# Patient Record
Sex: FEMALE | Race: WHITE | NOT HISPANIC OR LATINO
[De-identification: names, ages, dates, MRNs, and addresses within clinical notes are randomized per-mention and may not be internally consistent; named-entity substitution may affect disease eponyms.]

---

## 2021-07-16 ENCOUNTER — NON-APPOINTMENT (OUTPATIENT)
Age: 26
End: 2021-07-16

## 2021-08-13 PROBLEM — Z00.00 ENCOUNTER FOR PREVENTIVE HEALTH EXAMINATION: Status: ACTIVE | Noted: 2021-08-13

## 2021-08-20 ENCOUNTER — ASOB RESULT (OUTPATIENT)
Age: 26
End: 2021-08-20

## 2021-08-20 ENCOUNTER — APPOINTMENT (OUTPATIENT)
Dept: ANTEPARTUM | Facility: CLINIC | Age: 26
End: 2021-08-20
Payer: COMMERCIAL

## 2021-08-20 DIAGNOSIS — O35.1XX0 MATERNAL CARE FOR (SUSPECTED) CHROMOSOMAL ABNORMALITY IN FETUS, NOT APPLICABLE OR UNSPECIFIED: ICD-10-CM

## 2021-08-20 PROCEDURE — 76813 OB US NUCHAL MEAS 1 GEST: CPT

## 2021-08-20 PROCEDURE — 36416 COLLJ CAPILLARY BLOOD SPEC: CPT

## 2021-08-20 PROCEDURE — ZZZZZ: CPT

## 2021-08-23 ENCOUNTER — TRANSCRIPTION ENCOUNTER (OUTPATIENT)
Age: 26
End: 2021-08-23

## 2021-08-26 LAB
1ST TRIMESTER DATA: NORMAL
ADDENDUM DOC: NORMAL
AFP PNL SERPL: NORMAL
AFP SERPL-ACNC: NORMAL
CLINICAL BIOCHEMIST REVIEW: NORMAL
FREE BETA HCG 1ST TRIMESTER: NORMAL
Lab: NORMAL
NOTES NTD: NORMAL
NT: NORMAL
PAPP-A SERPL-ACNC: NORMAL
TRISOMY 18/3: NORMAL

## 2021-09-10 ENCOUNTER — ASOB RESULT (OUTPATIENT)
Age: 26
End: 2021-09-10

## 2021-09-10 ENCOUNTER — APPOINTMENT (OUTPATIENT)
Dept: ANTEPARTUM | Facility: CLINIC | Age: 26
End: 2021-09-10
Payer: COMMERCIAL

## 2021-09-10 PROCEDURE — 76815 OB US LIMITED FETUS(S): CPT

## 2021-10-15 ENCOUNTER — APPOINTMENT (OUTPATIENT)
Dept: ANTEPARTUM | Facility: CLINIC | Age: 26
End: 2021-10-15
Payer: COMMERCIAL

## 2021-10-15 ENCOUNTER — ASOB RESULT (OUTPATIENT)
Age: 26
End: 2021-10-15

## 2021-10-15 DIAGNOSIS — O35.9XX0 MATERNAL CARE FOR (SUSPECTED) FETAL ABNORMALITY AND DAMAGE, UNSPECIFIED, NOT APPLICABLE OR UNSPECIFIED: ICD-10-CM

## 2021-10-15 PROCEDURE — 76811 OB US DETAILED SNGL FETUS: CPT

## 2021-10-15 PROCEDURE — 99212 OFFICE O/P EST SF 10 MIN: CPT | Mod: 25

## 2021-10-15 PROCEDURE — 76817 TRANSVAGINAL US OBSTETRIC: CPT

## 2021-10-26 ENCOUNTER — APPOINTMENT (OUTPATIENT)
Dept: PEDIATRIC CARDIOLOGY | Facility: CLINIC | Age: 26
End: 2021-10-26
Payer: COMMERCIAL

## 2021-10-26 PROCEDURE — 76827 ECHO EXAM OF FETAL HEART: CPT

## 2021-10-26 PROCEDURE — 93325 DOPPLER ECHO COLOR FLOW MAPG: CPT

## 2021-10-26 PROCEDURE — 76825 ECHO EXAM OF FETAL HEART: CPT

## 2021-10-26 PROCEDURE — 99202 OFFICE O/P NEW SF 15 MIN: CPT

## 2021-11-12 ENCOUNTER — APPOINTMENT (OUTPATIENT)
Dept: ANTEPARTUM | Facility: CLINIC | Age: 26
End: 2021-11-12

## 2021-11-22 ENCOUNTER — APPOINTMENT (OUTPATIENT)
Dept: ANTEPARTUM | Facility: CLINIC | Age: 26
End: 2021-11-22
Payer: COMMERCIAL

## 2021-11-22 PROCEDURE — 93976 VASCULAR STUDY: CPT

## 2021-11-22 PROCEDURE — 76820 UMBILICAL ARTERY ECHO: CPT

## 2021-11-22 PROCEDURE — 76816 OB US FOLLOW-UP PER FETUS: CPT

## 2021-12-11 ENCOUNTER — TRANSCRIPTION ENCOUNTER (OUTPATIENT)
Age: 26
End: 2021-12-11

## 2021-12-12 ENCOUNTER — INPATIENT (INPATIENT)
Facility: HOSPITAL | Age: 26
LOS: 3 days | Discharge: HOME | End: 2021-12-16
Attending: OBSTETRICS & GYNECOLOGY | Admitting: OBSTETRICS & GYNECOLOGY
Payer: COMMERCIAL

## 2021-12-12 ENCOUNTER — RESULT REVIEW (OUTPATIENT)
Age: 26
End: 2021-12-12

## 2021-12-12 VITALS
SYSTOLIC BLOOD PRESSURE: 126 MMHG | RESPIRATION RATE: 15 BRPM | TEMPERATURE: 98 F | HEART RATE: 86 BPM | DIASTOLIC BLOOD PRESSURE: 81 MMHG

## 2021-12-12 DIAGNOSIS — Z98.890 OTHER SPECIFIED POSTPROCEDURAL STATES: Chronic | ICD-10-CM

## 2021-12-12 DIAGNOSIS — O45.93 PREMATURE SEPARATION OF PLACENTA, UNSPECIFIED, THIRD TRIMESTER: ICD-10-CM

## 2021-12-12 DIAGNOSIS — Z90.89 ACQUIRED ABSENCE OF OTHER ORGANS: Chronic | ICD-10-CM

## 2021-12-12 DIAGNOSIS — O26.899 OTHER SPECIFIED PREGNANCY RELATED CONDITIONS, UNSPECIFIED TRIMESTER: ICD-10-CM

## 2021-12-12 DIAGNOSIS — Z3A.00 WEEKS OF GESTATION OF PREGNANCY NOT SPECIFIED: ICD-10-CM

## 2021-12-12 LAB
ALBUMIN SERPL ELPH-MCNC: 3.6 G/DL — SIGNIFICANT CHANGE UP (ref 3.3–5)
ALP SERPL-CCNC: 92 U/L — SIGNIFICANT CHANGE UP (ref 40–120)
ALT FLD-CCNC: 11 U/L — SIGNIFICANT CHANGE UP (ref 4–33)
ANION GAP SERPL CALC-SCNC: 11 MMOL/L — SIGNIFICANT CHANGE UP (ref 7–14)
APPEARANCE UR: ABNORMAL
APTT BLD: 25 SEC — LOW (ref 27–36.3)
AST SERPL-CCNC: 15 U/L — SIGNIFICANT CHANGE UP (ref 4–32)
BACTERIA # UR AUTO: ABNORMAL
BASOPHILS # BLD AUTO: 0.04 K/UL — SIGNIFICANT CHANGE UP (ref 0–0.2)
BASOPHILS NFR BLD AUTO: 0.3 % — SIGNIFICANT CHANGE UP (ref 0–2)
BILIRUB SERPL-MCNC: 0.3 MG/DL — SIGNIFICANT CHANGE UP (ref 0.2–1.2)
BILIRUB UR-MCNC: NEGATIVE — SIGNIFICANT CHANGE UP
BLD GP AB SCN SERPL QL: NEGATIVE — SIGNIFICANT CHANGE UP
BUN SERPL-MCNC: 5 MG/DL — LOW (ref 7–23)
CALCIUM SERPL-MCNC: 9.2 MG/DL — SIGNIFICANT CHANGE UP (ref 8.4–10.5)
CHLORIDE SERPL-SCNC: 103 MMOL/L — SIGNIFICANT CHANGE UP (ref 98–107)
CO2 SERPL-SCNC: 23 MMOL/L — SIGNIFICANT CHANGE UP (ref 22–31)
COLOR SPEC: COLORLESS — SIGNIFICANT CHANGE UP
COVID-19 SPIKE DOMAIN AB INTERP: POSITIVE
COVID-19 SPIKE DOMAIN ANTIBODY RESULT: 179 U/ML — HIGH
CREAT SERPL-MCNC: 0.48 MG/DL — LOW (ref 0.5–1.3)
DIFF PNL FLD: ABNORMAL
EOSINOPHIL # BLD AUTO: 0.04 K/UL — SIGNIFICANT CHANGE UP (ref 0–0.5)
EOSINOPHIL NFR BLD AUTO: 0.3 % — SIGNIFICANT CHANGE UP (ref 0–6)
EPI CELLS # UR: 4 /HPF — SIGNIFICANT CHANGE UP (ref 0–5)
FIBRINOGEN PPP-MCNC: 619 MG/DL — HIGH (ref 290–520)
GLUCOSE SERPL-MCNC: 74 MG/DL — SIGNIFICANT CHANGE UP (ref 70–99)
GLUCOSE UR QL: NEGATIVE — SIGNIFICANT CHANGE UP
HCT VFR BLD CALC: 33.4 % — LOW (ref 34.5–45)
HGB BLD-MCNC: 11 G/DL — LOW (ref 11.5–15.5)
HYALINE CASTS # UR AUTO: 1 /LPF — SIGNIFICANT CHANGE UP (ref 0–7)
IANC: 10.32 K/UL — HIGH (ref 1.5–8.5)
IMM GRANULOCYTES NFR BLD AUTO: 1 % — SIGNIFICANT CHANGE UP (ref 0–1.5)
INR BLD: 0.98 RATIO — SIGNIFICANT CHANGE UP (ref 0.88–1.16)
KETONES UR-MCNC: NEGATIVE — SIGNIFICANT CHANGE UP
LEUKOCYTE ESTERASE UR-ACNC: ABNORMAL
LYMPHOCYTES # BLD AUTO: 16.9 % — SIGNIFICANT CHANGE UP (ref 13–44)
LYMPHOCYTES # BLD AUTO: 2.26 K/UL — SIGNIFICANT CHANGE UP (ref 1–3.3)
MCHC RBC-ENTMCNC: 30.7 PG — SIGNIFICANT CHANGE UP (ref 27–34)
MCHC RBC-ENTMCNC: 32.9 GM/DL — SIGNIFICANT CHANGE UP (ref 32–36)
MCV RBC AUTO: 93.3 FL — SIGNIFICANT CHANGE UP (ref 80–100)
MONOCYTES # BLD AUTO: 0.62 K/UL — SIGNIFICANT CHANGE UP (ref 0–0.9)
MONOCYTES NFR BLD AUTO: 4.6 % — SIGNIFICANT CHANGE UP (ref 2–14)
NEUTROPHILS # BLD AUTO: 10.32 K/UL — HIGH (ref 1.8–7.4)
NEUTROPHILS NFR BLD AUTO: 76.9 % — SIGNIFICANT CHANGE UP (ref 43–77)
NITRITE UR-MCNC: NEGATIVE — SIGNIFICANT CHANGE UP
NRBC # BLD: 0 /100 WBCS — SIGNIFICANT CHANGE UP
NRBC # FLD: 0 K/UL — SIGNIFICANT CHANGE UP
PH UR: 7 — SIGNIFICANT CHANGE UP (ref 5–8)
PLATELET # BLD AUTO: 256 K/UL — SIGNIFICANT CHANGE UP (ref 150–400)
POTASSIUM SERPL-MCNC: 4 MMOL/L — SIGNIFICANT CHANGE UP (ref 3.5–5.3)
POTASSIUM SERPL-SCNC: 4 MMOL/L — SIGNIFICANT CHANGE UP (ref 3.5–5.3)
PROT SERPL-MCNC: 5.9 G/DL — LOW (ref 6–8.3)
PROT UR-MCNC: ABNORMAL
PROTHROM AB SERPL-ACNC: 11.2 SEC — SIGNIFICANT CHANGE UP (ref 10.6–13.6)
RBC # BLD: 3.58 M/UL — LOW (ref 3.8–5.2)
RBC # FLD: 12.4 % — SIGNIFICANT CHANGE UP (ref 10.3–14.5)
RBC CASTS # UR COMP ASSIST: >50 /HPF — SIGNIFICANT CHANGE UP (ref 0–4)
RH IG SCN BLD-IMP: POSITIVE — SIGNIFICANT CHANGE UP
RH IG SCN BLD-IMP: POSITIVE — SIGNIFICANT CHANGE UP
SARS-COV-2 IGG+IGM SERPL QL IA: 179 U/ML — HIGH
SARS-COV-2 IGG+IGM SERPL QL IA: POSITIVE
SARS-COV-2 RNA SPEC QL NAA+PROBE: SIGNIFICANT CHANGE UP
SODIUM SERPL-SCNC: 137 MMOL/L — SIGNIFICANT CHANGE UP (ref 135–145)
SP GR SPEC: 1.01 — SIGNIFICANT CHANGE UP (ref 1–1.05)
UROBILINOGEN FLD QL: SIGNIFICANT CHANGE UP
WBC # BLD: 13.41 K/UL — HIGH (ref 3.8–10.5)
WBC # FLD AUTO: 13.41 K/UL — HIGH (ref 3.8–10.5)
WBC UR QL: SIGNIFICANT CHANGE UP /HPF (ref 0–5)

## 2021-12-12 PROCEDURE — 88307 TISSUE EXAM BY PATHOLOGIST: CPT | Mod: 26

## 2021-12-12 PROCEDURE — 99222 1ST HOSP IP/OBS MODERATE 55: CPT

## 2021-12-12 RX ORDER — AMPICILLIN TRIHYDRATE 250 MG
2 CAPSULE ORAL ONCE
Refills: 0 | Status: COMPLETED | OUTPATIENT
Start: 2021-12-12 | End: 2021-12-12

## 2021-12-12 RX ORDER — INDOMETHACIN 50 MG
50 CAPSULE ORAL ONCE
Refills: 0 | Status: COMPLETED | OUTPATIENT
Start: 2021-12-12 | End: 2021-12-12

## 2021-12-12 RX ORDER — IBUPROFEN 200 MG
600 TABLET ORAL EVERY 6 HOURS
Refills: 0 | Status: COMPLETED | OUTPATIENT
Start: 2021-12-12 | End: 2022-11-10

## 2021-12-12 RX ORDER — OXYCODONE HYDROCHLORIDE 5 MG/1
5 TABLET ORAL ONCE
Refills: 0 | Status: DISCONTINUED | OUTPATIENT
Start: 2021-12-12 | End: 2021-12-16

## 2021-12-12 RX ORDER — OXYTOCIN 10 UNIT/ML
333.33 VIAL (ML) INJECTION
Qty: 20 | Refills: 0 | Status: DISCONTINUED | OUTPATIENT
Start: 2021-12-12 | End: 2021-12-15

## 2021-12-12 RX ORDER — KETOROLAC TROMETHAMINE 30 MG/ML
30 SYRINGE (ML) INJECTION EVERY 6 HOURS
Refills: 0 | Status: DISCONTINUED | OUTPATIENT
Start: 2021-12-12 | End: 2021-12-13

## 2021-12-12 RX ORDER — LANOLIN
1 OINTMENT (GRAM) TOPICAL EVERY 6 HOURS
Refills: 0 | Status: DISCONTINUED | OUTPATIENT
Start: 2021-12-12 | End: 2021-12-16

## 2021-12-12 RX ORDER — TETANUS TOXOID, REDUCED DIPHTHERIA TOXOID AND ACELLULAR PERTUSSIS VACCINE, ADSORBED 5; 2.5; 8; 8; 2.5 [IU]/.5ML; [IU]/.5ML; UG/.5ML; UG/.5ML; UG/.5ML
0.5 SUSPENSION INTRAMUSCULAR ONCE
Refills: 0 | Status: DISCONTINUED | OUTPATIENT
Start: 2021-12-12 | End: 2021-12-16

## 2021-12-12 RX ORDER — DIPHENHYDRAMINE HCL 50 MG
25 CAPSULE ORAL EVERY 6 HOURS
Refills: 0 | Status: DISCONTINUED | OUTPATIENT
Start: 2021-12-12 | End: 2021-12-16

## 2021-12-12 RX ORDER — INFLUENZA VIRUS VACCINE 15; 15; 15; 15 UG/.5ML; UG/.5ML; UG/.5ML; UG/.5ML
0.5 SUSPENSION INTRAMUSCULAR ONCE
Refills: 0 | Status: DISCONTINUED | OUTPATIENT
Start: 2021-12-12 | End: 2021-12-16

## 2021-12-12 RX ORDER — MAGNESIUM SULFATE 500 MG/ML
4 VIAL (ML) INJECTION ONCE
Refills: 0 | Status: COMPLETED | OUTPATIENT
Start: 2021-12-12 | End: 2021-12-12

## 2021-12-12 RX ORDER — SODIUM CHLORIDE 9 MG/ML
1000 INJECTION, SOLUTION INTRAVENOUS
Refills: 0 | Status: DISCONTINUED | OUTPATIENT
Start: 2021-12-12 | End: 2021-12-13

## 2021-12-12 RX ORDER — FERROUS SULFATE 325(65) MG
1 TABLET ORAL
Qty: 0 | Refills: 0 | DISCHARGE
Start: 2021-12-12

## 2021-12-12 RX ORDER — ALBUTEROL 90 UG/1
2 AEROSOL, METERED ORAL
Qty: 0 | Refills: 0 | DISCHARGE
Start: 2021-12-12

## 2021-12-12 RX ORDER — SIMETHICONE 80 MG/1
80 TABLET, CHEWABLE ORAL EVERY 4 HOURS
Refills: 0 | Status: DISCONTINUED | OUTPATIENT
Start: 2021-12-12 | End: 2021-12-16

## 2021-12-12 RX ORDER — MAGNESIUM SULFATE 500 MG/ML
2 VIAL (ML) INJECTION
Qty: 40 | Refills: 0 | Status: DISCONTINUED | OUTPATIENT
Start: 2021-12-12 | End: 2021-12-13

## 2021-12-12 RX ORDER — AMPICILLIN TRIHYDRATE 250 MG
1 CAPSULE ORAL EVERY 4 HOURS
Refills: 0 | Status: DISCONTINUED | OUTPATIENT
Start: 2021-12-12 | End: 2021-12-13

## 2021-12-12 RX ORDER — HEPARIN SODIUM 5000 [USP'U]/ML
5000 INJECTION INTRAVENOUS; SUBCUTANEOUS EVERY 12 HOURS
Refills: 0 | Status: DISCONTINUED | OUTPATIENT
Start: 2021-12-12 | End: 2021-12-16

## 2021-12-12 RX ORDER — OXYTOCIN 10 UNIT/ML
333.33 VIAL (ML) INJECTION
Qty: 20 | Refills: 0 | Status: DISCONTINUED | OUTPATIENT
Start: 2021-12-12 | End: 2021-12-13

## 2021-12-12 RX ORDER — ALBUTEROL 90 UG/1
2 AEROSOL, METERED ORAL EVERY 6 HOURS
Refills: 0 | Status: DISCONTINUED | OUTPATIENT
Start: 2021-12-12 | End: 2021-12-16

## 2021-12-12 RX ORDER — FAMOTIDINE 10 MG/ML
20 INJECTION INTRAVENOUS ONCE
Refills: 0 | Status: COMPLETED | OUTPATIENT
Start: 2021-12-12 | End: 2021-12-12

## 2021-12-12 RX ORDER — FERROUS SULFATE 325(65) MG
325 TABLET ORAL DAILY
Refills: 0 | Status: DISCONTINUED | OUTPATIENT
Start: 2021-12-12 | End: 2021-12-16

## 2021-12-12 RX ORDER — ACETAMINOPHEN 500 MG
975 TABLET ORAL
Refills: 0 | Status: DISCONTINUED | OUTPATIENT
Start: 2021-12-12 | End: 2021-12-16

## 2021-12-12 RX ORDER — OXYCODONE HYDROCHLORIDE 5 MG/1
5 TABLET ORAL
Refills: 0 | Status: DISCONTINUED | OUTPATIENT
Start: 2021-12-12 | End: 2021-12-16

## 2021-12-12 RX ORDER — MAGNESIUM HYDROXIDE 400 MG/1
30 TABLET, CHEWABLE ORAL
Refills: 0 | Status: DISCONTINUED | OUTPATIENT
Start: 2021-12-12 | End: 2021-12-16

## 2021-12-12 RX ORDER — ASPIRIN/CALCIUM CARB/MAGNESIUM 324 MG
0 TABLET ORAL
Qty: 0 | Refills: 0 | DISCHARGE

## 2021-12-12 RX ADMIN — Medication 975 MILLIGRAM(S): at 23:30

## 2021-12-12 RX ADMIN — SIMETHICONE 80 MILLIGRAM(S): 80 TABLET, CHEWABLE ORAL at 22:57

## 2021-12-12 RX ADMIN — Medication 12 MILLIGRAM(S): at 11:08

## 2021-12-12 RX ADMIN — SODIUM CHLORIDE 75 MILLILITER(S): 9 INJECTION, SOLUTION INTRAVENOUS at 11:08

## 2021-12-12 RX ADMIN — Medication 216 GRAM(S): at 11:14

## 2021-12-12 RX ADMIN — Medication 50 MILLIGRAM(S): at 12:53

## 2021-12-12 RX ADMIN — Medication 975 MILLIGRAM(S): at 22:57

## 2021-12-12 RX ADMIN — Medication 30 MILLIGRAM(S): at 20:07

## 2021-12-12 RX ADMIN — HEPARIN SODIUM 5000 UNIT(S): 5000 INJECTION INTRAVENOUS; SUBCUTANEOUS at 22:58

## 2021-12-12 RX ADMIN — FAMOTIDINE 20 MILLIGRAM(S): 10 INJECTION INTRAVENOUS at 12:53

## 2021-12-12 RX ADMIN — Medication 30 MILLIGRAM(S): at 20:58

## 2021-12-12 RX ADMIN — Medication 300 GRAM(S): at 11:06

## 2021-12-12 RX ADMIN — Medication 50 GM/HR: at 11:31

## 2021-12-12 NOTE — DISCHARGE NOTE ANTEPARTUM - NS MD DC FALL RISK RISK
For information on Fall & Injury Prevention, visit: [email or web address] OR  [email or web address] OR  https://www.cdc.gov/steadi/patient.html

## 2021-12-12 NOTE — OB RN PATIENT PROFILE - BP NONINVASIVE DIASTOLIC (MM HG)
Fluconazole Counseling:  Patient counseled regarding adverse effects of fluconazole including but not limited to headache, diarrhea, nausea, upset stomach, liver function test abnormalities, taste disturbance, and stomach pain.  There is a rare possibility of liver failure that can occur when taking fluconazole.  The patient understands that monitoring of LFTs and kidney function test may be required, especially at baseline. The patient verbalized understanding of the proper use and possible adverse effects of fluconazole.  All of the patient's questions and concerns were addressed. 71

## 2021-12-12 NOTE — DISCHARGE NOTE ANTEPARTUM - HOSPITAL COURSE
admitted for bleeding at 28+ wks  pt made cervical change to 7cm, breech  emergent LTCS 12/12- male apgars 6/9, 2lb1oz  probable abruption  bicornuate uterus noted

## 2021-12-12 NOTE — OB PROVIDER H&P - ASSESSMENT
25yo  female  @ 28.5 wks SLIUP with Low OLIVIER-A; a marginal umbilical cord insertion; an umbilical cord cyst; and persistent L persistent vena cava here with vaginal bleeding that started at 4am with some abdominal tightening  -pt with +vaginal bleeding with possible small abruption  -fetus is transverse  -pt with bicornuate uterus  -Dr Gaxiola in to see and scan pt  -pt admitted for magnesium sulfate, betamethasone; abx's  -GBS cx collected  -pt and support were swabbed for covid-19  -pt reports she was tested covid-19 positive <90 days but does not have proof  -pt was dw Dr Nuno

## 2021-12-12 NOTE — DISCHARGE NOTE ANTEPARTUM - CARE PROVIDER_API CALL
Katy Rhodes)  Obstetrics and Gynecology  [address] [place] [address]  Phone: [phone]  Fax: [phone]  Established Patient  Follow Up Time: 2 weeks

## 2021-12-12 NOTE — OB PROVIDER H&P - HISTORY OF PRESENT ILLNESS
25yo  female  @ 28.5 wks SLIUP here complaining that when she awoke at 4 am and went to the bathroom she saw some bleeding. She then got up again at 7 am and there was even more vaginal bleeding. Pt reports some abdominal pains that she believes is Chazy Garcia and some decreased FM. Pt denies LOF. Pt reports PNC complicated by Low OLIVIER-A and is on Baby ASA; a marginal umbilical cord insertion; an umbilical cord cyst; and persistent L persistent vena cava. Pt also reports that on last sono, the baby was measuring on the smaller size.    Pmhx-childhood asthma-last attack 15 yrs ago  Pshx/[id number]; DVC x 1 2016  Meds-PNV  NKDA  Past ob-SAB x 1 with DVC 2016  Gyn-denies  Soc-denies

## 2021-12-12 NOTE — OB NEONATOLOGY/PEDIATRICIAN DELIVERY SUMMARY - NSPEDSNEONOTESA_OBGYN_ALL_OB_FT
HPI: 27yo  female  @ 28.5 wks SLIUP with Low OLIVIER-A; a marginal umbilical cord insertion; an umbilical cord cyst; and persistent L persistent vena cava here with vaginal bleeding that started at 4am with some abdominal tightening.  HIV pending, RPR Immune, All other labs pending.  Mother stated she had COVID in november 2021. Infant born via stat c/s secondary to breech presentation and full dilation.  WDSS placed on a thermo mattress and in thermo bag.  CPAP 5 30 % then weaned to 25%.  Temp prior to transfer was 36.6.  Transferred to NICU on bubble cpap 5.   Apgars 6 & 8.

## 2021-12-12 NOTE — OB PROVIDER TRIAGE NOTE - NSOBPROVIDERNOTE_OBGYN_ALL_OB_FT
27yo  female  @ 28.5 wks SLIUP with Low OLIVIER-A; a marginal umbilical cord insertion; an umbilical cord cyst; and persistent L persistent vena cava here with vaginal bleeding that started at 4am with some abdominal tightening  -pt with +vaginal bleeding with possible small abruption  -fetus is transverse  -pt with bicornuate uterus  -Dr Gaxiola in to see and scan pt  -pt admitted for magnesium sulfate, betamethasone; abx's  -GBS cx collected  -pt and support were swabbed for covid-19  -pt reports she was tested covid-19 positive <90 days but does not have proof  -pt was dw Dr Nuno

## 2021-12-12 NOTE — H&P ADULT - NSHPPHYSICALEXAM_GEN_ALL_CORE
Gen - NAD, comfortable  Abdomen - soft, nontender, gravid  Skin - no rashes, normal pallor  LEs nontender bilaterally

## 2021-12-12 NOTE — OB PROVIDER H&P - DOMESTIC TRAVEL HIGH RISK QUESTION
--------------  CONTINUED STAY REVIEW    Payor: MEDICAID PENDING  Subscriber #:  [id number]  Authorization Number: Mother stefany Byrd [id number] ; : 10/[birth year]/1995    Admit date: 17  Admit time: 0    Admitting Physician: Arjun Barreto MD  Attending No

## 2021-12-12 NOTE — OB PROVIDER LABOR PROGRESS NOTE - ASSESSMENT
Patient now in active labor and breech  - Urgent  section performed at this time  -  in house  - Anesthesia bedside  - Pt moved to [facility] Leon PGY3 
Pt currently making cervical change therefore PTL likely 2/2 abruption  - cw Mg for neuroprotection  - BMZ for FLM  - Indocin for tocolysis  - Ampicillin for GBS ppx  - NICU to see pt    dw Dr.Pachtman Johnny Leon PGY3

## 2021-12-12 NOTE — OB PROVIDER DELIVERY SUMMARY - NSSELHIDDEN_OBGYN_ALL_OB_FT
[NS_DeliveryAttending1_OBGYN_ALL_OB_FT:[id number]],[NS_DeliveryAssist1_OBGYN_ALL_OB_FT:[id number]=],[NS_DeliveryRN_OBGYN_ALL_OB_FT:[id number]=]

## 2021-12-12 NOTE — DISCHARGE NOTE ANTEPARTUM - MEDICATION SUMMARY - MEDICATIONS TO TAKE
I will START or STAY ON the medications listed below when I get home from the hospital:    albuterol 90 mcg/inh inhalation aerosol  -- 2 puff(s) inhaled every 6 hours, As needed, Shortness of Breath and/or Wheezing  -- Indication: For Asthma    ferrous sulfate 325 mg (65 mg elemental iron) oral tablet  -- 1 tab(s) by mouth once a day  -- Indication: For supplementation

## 2021-12-12 NOTE — OB RN DELIVERY SUMMARY - NS_SEPSISRSKCALC_OBGYN_ALL_OB_FT
Unable to calculate the EOS score due to gestational age being less than 34 weeks or more than 43 weeks.  GBS status in the 'Prenatal Lab tests/results section' on the OB RN Patient Profile must be documented.

## 2021-12-12 NOTE — OB PROVIDER DELIVERY SUMMARY - NSPROVIDERDELIVERYNOTE_OBGYN_ALL_OB_FT
pLTCS for  labor and breech  Viable Male infant, apgars 6/9 weight 2#1  Hysterotomy closed in 1 layer using vicryl and caprosyn  Shar placed over hysterotomy   Bicornuate uterus with normal tubes, and ovaries  Abdomen closed in standard fashion  Pt and infant to recovery in stable condition  Placenta to pathology  EBL:280   IVF: 1700   UOP: 480 pLTCS for  labor and breech  Viable Male infant, apgars 6/9 weight 2#1  Blood clot visualized upon entry into uterine cavity  Hysterotomy closed in 1 layer using vicryl and caprosyn  Shar placed over hysterotomy   Bicornuate uterus with normal tubes, and ovaries  Abdomen closed in standard fashion  Pt and infant to recovery in stable condition  Placenta to pathology  EBL:280   IVF: 1700   UOP: 480 pLTCS for  labor and breech  Viable Male infant, apgars 6/9 weight 2#1  Blood clots visualized upon entry into uterine cavity, suspect placental abruption  Hysterotomy closed in 1 layer using vicryl and caprosyn  Shar placed over hysterotomy   Bicornuate uterus with normal tubes, and ovaries  Abdomen closed in standard fashion  Pt and infant to recovery in stable condition  Placenta to pathology  EBL:280   IVF: 1700   UOP: 480

## 2021-12-12 NOTE — OB PROVIDER TRIAGE NOTE - NSHPPHYSICALEXAM_GEN_ALL_CORE
Gen: A&O x 3; NAD  Vitals; BP-114/72; P-86; T-36.7    Pulm-CTA B/L; no wheezes  Cor-clear S1S2; no murmurs  Abd exam-soft and nontender    SSE-scant ~1cc blood tinged mucous coming from os; scant oozing from the os; os appears closed. Negative pooling/ equivocal nitrazine; negative ferning  TVS-1.71-1.89 cm no funnel; + blood tinged amniotic fluid in KENNEDY  TAS-transverse with head to maternal left; 979g; ZACH appears on the lower end; +bicornuate uterus with collapsed uterus visible in RUQ of pt's abdomen; posterior placenta    NST cat I with 145 baseline with moderate variability; +irritability on toco

## 2021-12-12 NOTE — OB RN DELIVERY SUMMARY - NSSELHIDDEN_OBGYN_ALL_OB_FT
[NS_DeliveryAttending1_OBGYN_ALL_OB_FT:[email or web address]],[NS_DeliveryAssist1_OBGYN_ALL_OB_FT:[id number]=],[NS_DeliveryRN_OBGYN_ALL_OB_FT:[id number]=]

## 2021-12-12 NOTE — H&P ADULT - TIME BILLING
I saw and evaluated Ms. Cook and discussed her case and plan of care with her, her mother at bedside, and Dr. Nuno.     She presents today with vaginal bleeding since 4am, now minimal with only blood mucous in the vaginal vault. Cervix is short on TVUS. She denies painful contractions but is irritable on toco. Fetal status is reassuring with baseline 150, mod karina, reactive (10x10 bmp) with occasional mild variable decelerations. BPP 8/8.   Pregnancy complicated by umbilical cord cyst (not visible on bedside ultrasound exam today) and possible persistent right SVC. Reports ?fetal growth restriction.   She has a bicornuate uterus, fetus is transverse with head in the left horn and placenta in the right horn.   The amniotic fluid has echogenic particulate matter concerning for possible abruption, the cervix is short (1.7cm) but appears closed without any material in the canal.   UA S/D 2.42, normal.     Given her presentation and risk factors (uterine anomaly), will admit for observation, betamethasone and magnesium for fetal neuroprotection.   NICU consultation is appreciated.   All questions were answered.   Labs, GBS, and COVID ordered.     Corinna Gaxiola MD

## 2021-12-12 NOTE — CHART NOTE - NSCHARTNOTEFT_GEN_A_CORE
pt admitted for r/o abruption.  report 2 episodes of bleeding when used restroom this am   in triage, noted to have mild bleeding and ctx  admitted for beta, steroids, and mg   pt continued to have painful ctx and made cervical change to 7cm with breech presentation  pt consented for emergency cs for breech in labor

## 2021-12-12 NOTE — H&P ADULT - ASSESSMENT
at 28 5/7 weeks with bicornuate uterus presenting with vaginal bleeding, differential diagnosis includes  labor and placental abruption. Clinically stable with reassuring fetal status.

## 2021-12-12 NOTE — OB RN TRIAGE NOTE - NSICDXPASTSURGICALHX_GEN_ALL_CORE_FT
PAST SURGICAL HISTORY:  History of D&C 2016    History of tonsillectomy 2012    History of tonsillectomy

## 2021-12-12 NOTE — DISCHARGE NOTE ANTEPARTUM - PATIENT PORTAL LINK FT
You can access the FollowMyHealth Patient Portal offered by [facility] by registering at the following website: [email or web address]. By joining [employer]’s FollowMyHealth portal, you will also be able to view your health information using other applications (apps) compatible with our system.

## 2021-12-12 NOTE — OB PROVIDER LABOR PROGRESS NOTE - NS_SUBJECTIVE/OBJECTIVE_OBGYN_ALL_OB_FT
Entry delayed due to acuity of patient status.    Pt feeling more pain with contractions
Pt evaluated for feeling more contractions

## 2021-12-12 NOTE — OB RN INTRAOPERATIVE NOTE - NSSELHIDDEN_OBGYN_ALL_OB_FT
[NS_DeliveryAttending1_OBGYN_ALL_OB_FT:[email or web address]],[NS_DeliveryAssist1_OBGYN_ALL_OB_FT:[email or web address]=],[NS_DeliveryRN_OBGYN_ALL_OB_FT:[id number]=]

## 2021-12-13 ENCOUNTER — APPOINTMENT (OUTPATIENT)
Dept: ANTEPARTUM | Facility: CLINIC | Age: 26
End: 2021-12-13

## 2021-12-13 LAB
BASOPHILS # BLD AUTO: 0.02 K/UL — SIGNIFICANT CHANGE UP (ref 0–0.2)
BASOPHILS NFR BLD AUTO: 0.1 % — SIGNIFICANT CHANGE UP (ref 0–2)
EOSINOPHIL # BLD AUTO: 0 K/UL — SIGNIFICANT CHANGE UP (ref 0–0.5)
EOSINOPHIL NFR BLD AUTO: 0 % — SIGNIFICANT CHANGE UP (ref 0–6)
HCT VFR BLD CALC: 28.2 % — LOW (ref 34.5–45)
HGB BLD-MCNC: 9.9 G/DL — LOW (ref 11.5–15.5)
IANC: 20.96 K/UL — HIGH (ref 1.5–8.5)
IMM GRANULOCYTES NFR BLD AUTO: 1 % — SIGNIFICANT CHANGE UP (ref 0–1.5)
KLEIHAUER-BETKE CALCULATION: 0 % — SIGNIFICANT CHANGE UP (ref 0–0.3)
LYMPHOCYTES # BLD AUTO: 1.78 K/UL — SIGNIFICANT CHANGE UP (ref 1–3.3)
LYMPHOCYTES # BLD AUTO: 7.5 % — LOW (ref 13–44)
MCHC RBC-ENTMCNC: 31.5 PG — SIGNIFICANT CHANGE UP (ref 27–34)
MCHC RBC-ENTMCNC: 35.1 GM/DL — SIGNIFICANT CHANGE UP (ref 32–36)
MCV RBC AUTO: 89.8 FL — SIGNIFICANT CHANGE UP (ref 80–100)
MONOCYTES # BLD AUTO: 0.77 K/UL — SIGNIFICANT CHANGE UP (ref 0–0.9)
MONOCYTES NFR BLD AUTO: 3.2 % — SIGNIFICANT CHANGE UP (ref 2–14)
NEUTROPHILS # BLD AUTO: 20.96 K/UL — HIGH (ref 1.8–7.4)
NEUTROPHILS NFR BLD AUTO: 88.2 % — HIGH (ref 43–77)
NRBC # BLD: 0 /100 WBCS — SIGNIFICANT CHANGE UP
NRBC # FLD: 0 K/UL — SIGNIFICANT CHANGE UP
PLATELET # BLD AUTO: 299 K/UL — SIGNIFICANT CHANGE UP (ref 150–400)
RBC # BLD: 3.14 M/UL — LOW (ref 3.8–5.2)
RBC # FLD: 12.2 % — SIGNIFICANT CHANGE UP (ref 10.3–14.5)
T PALLIDUM AB TITR SER: NEGATIVE — SIGNIFICANT CHANGE UP
WBC # BLD: 23.76 K/UL — HIGH (ref 3.8–10.5)
WBC # FLD AUTO: 23.76 K/UL — HIGH (ref 3.8–10.5)

## 2021-12-13 RX ORDER — IBUPROFEN 200 MG
600 TABLET ORAL EVERY 6 HOURS
Refills: 0 | Status: DISCONTINUED | OUTPATIENT
Start: 2021-12-13 | End: 2021-12-16

## 2021-12-13 RX ADMIN — SIMETHICONE 80 MILLIGRAM(S): 80 TABLET, CHEWABLE ORAL at 05:32

## 2021-12-13 RX ADMIN — Medication 975 MILLIGRAM(S): at 15:51

## 2021-12-13 RX ADMIN — Medication 975 MILLIGRAM(S): at 16:51

## 2021-12-13 RX ADMIN — Medication 600 MILLIGRAM(S): at 18:29

## 2021-12-13 RX ADMIN — Medication 30 MILLIGRAM(S): at 12:15

## 2021-12-13 RX ADMIN — Medication 975 MILLIGRAM(S): at 21:40

## 2021-12-13 RX ADMIN — Medication 975 MILLIGRAM(S): at 22:40

## 2021-12-13 RX ADMIN — Medication 975 MILLIGRAM(S): at 09:45

## 2021-12-13 RX ADMIN — Medication 30 MILLIGRAM(S): at 06:19

## 2021-12-13 RX ADMIN — Medication 1 TABLET(S): at 11:15

## 2021-12-13 RX ADMIN — HEPARIN SODIUM 5000 UNIT(S): 5000 INJECTION INTRAVENOUS; SUBCUTANEOUS at 11:15

## 2021-12-13 RX ADMIN — Medication 30 MILLIGRAM(S): at 05:32

## 2021-12-13 RX ADMIN — Medication 600 MILLIGRAM(S): at 19:29

## 2021-12-13 RX ADMIN — Medication 30 MILLIGRAM(S): at 11:15

## 2021-12-13 RX ADMIN — Medication 325 MILLIGRAM(S): at 11:15

## 2021-12-13 RX ADMIN — Medication 975 MILLIGRAM(S): at 08:45

## 2021-12-13 NOTE — PROGRESS NOTE ADULT - ASSESSMENT
A/P: 25yo w/ hx of  bicornuate uterus and asthma now POD#1 s/p pLTCS @ 28w GA for breech presentation/placental abruption.  Patient is stable and doing well post-operatively.      #Asthma  -Albuterol PRN    #PP care  - Continue regular diet.  - Increase ambulation.  - Continue motrin, tylenol, oxycodone PRN for pain control.   - F/u AM CBC    Beryl Gomez, PGY1

## 2021-12-13 NOTE — PROGRESS NOTE ADULT - ATTENDING COMMENTS
OBGYN Attending    Pt seen at bedside.  Agree with above.  Doing well POD#1.  Pain controlled.  Routine post-op care.    BLAIR Max MD

## 2021-12-14 RX ADMIN — Medication 975 MILLIGRAM(S): at 06:13

## 2021-12-14 RX ADMIN — Medication 975 MILLIGRAM(S): at 14:36

## 2021-12-14 RX ADMIN — HEPARIN SODIUM 5000 UNIT(S): 5000 INJECTION INTRAVENOUS; SUBCUTANEOUS at 22:20

## 2021-12-14 RX ADMIN — Medication 1 TABLET(S): at 11:34

## 2021-12-14 RX ADMIN — Medication 975 MILLIGRAM(S): at 22:25

## 2021-12-14 RX ADMIN — Medication 325 MILLIGRAM(S): at 11:34

## 2021-12-14 RX ADMIN — Medication 600 MILLIGRAM(S): at 18:15

## 2021-12-14 RX ADMIN — Medication 975 MILLIGRAM(S): at 07:13

## 2021-12-14 RX ADMIN — Medication 600 MILLIGRAM(S): at 18:12

## 2021-12-14 RX ADMIN — Medication 975 MILLIGRAM(S): at 22:45

## 2021-12-14 RX ADMIN — HEPARIN SODIUM 5000 UNIT(S): 5000 INJECTION INTRAVENOUS; SUBCUTANEOUS at 11:35

## 2021-12-14 RX ADMIN — Medication 600 MILLIGRAM(S): at 12:25

## 2021-12-14 RX ADMIN — Medication 600 MILLIGRAM(S): at 11:34

## 2021-12-14 RX ADMIN — Medication 975 MILLIGRAM(S): at 15:00

## 2021-12-14 NOTE — PROGRESS NOTE ADULT - ASSESSMENT
A/P: 25yo w/ hx of  bicornuate uterus and asthma now POD#2 s/p pLTCS @ 28w GA for breech presentation/placental abruption.  Patient is stable and doing well post-operatively.      #Asthma  -Albuterol PRN    #PP care  - Continue regular diet.  - Increase ambulation.  - Continue motrin, tylenol, oxycodone PRN for pain control.   -Discharge planning    Beryl Gomez, PGY1

## 2021-12-15 RX ADMIN — Medication 325 MILLIGRAM(S): at 11:15

## 2021-12-15 RX ADMIN — Medication 975 MILLIGRAM(S): at 15:43

## 2021-12-15 RX ADMIN — Medication 975 MILLIGRAM(S): at 22:09

## 2021-12-15 RX ADMIN — Medication 975 MILLIGRAM(S): at 16:32

## 2021-12-15 RX ADMIN — Medication 1 TABLET(S): at 11:15

## 2021-12-15 RX ADMIN — Medication 975 MILLIGRAM(S): at 06:14

## 2021-12-15 RX ADMIN — Medication 600 MILLIGRAM(S): at 06:44

## 2021-12-15 RX ADMIN — Medication 975 MILLIGRAM(S): at 06:44

## 2021-12-15 RX ADMIN — Medication 975 MILLIGRAM(S): at 22:39

## 2021-12-15 RX ADMIN — HEPARIN SODIUM 5000 UNIT(S): 5000 INJECTION INTRAVENOUS; SUBCUTANEOUS at 11:15

## 2021-12-15 RX ADMIN — Medication 600 MILLIGRAM(S): at 06:14

## 2021-12-15 RX ADMIN — Medication 600 MILLIGRAM(S): at 00:04

## 2021-12-15 RX ADMIN — Medication 600 MILLIGRAM(S): at 00:34

## 2021-12-15 RX ADMIN — Medication 600 MILLIGRAM(S): at 23:56

## 2021-12-15 RX ADMIN — HEPARIN SODIUM 5000 UNIT(S): 5000 INJECTION INTRAVENOUS; SUBCUTANEOUS at 22:09

## 2021-12-15 NOTE — PROGRESS NOTE ADULT - ASSESSMENT
A/P: 27yo w/ hx of  bicornuate uterus and asthma now POD#3 s/p pLTCS @ 28w GA for breech presentation/placental abruption.  Patient is stable and doing well post-operatively.      #Asthma  -Albuterol PRN    #PP care  - Continue regular diet.  - Increase ambulation.  - Continue motrin, tylenol, oxycodone PRN for pain control.   -May consider repeating CBC w/ diff given patient's elevated WBC from 12/13, however, patient remains afebrile and incision site is c/d/i.  - Discharge planning    Beryl Gomez, PGY1

## 2021-12-16 ENCOUNTER — TRANSCRIPTION ENCOUNTER (OUTPATIENT)
Age: 26
End: 2021-12-16

## 2021-12-16 VITALS
HEART RATE: 81 BPM | DIASTOLIC BLOOD PRESSURE: 71 MMHG | OXYGEN SATURATION: 100 % | SYSTOLIC BLOOD PRESSURE: 116 MMHG | TEMPERATURE: 98 F | RESPIRATION RATE: 17 BRPM

## 2021-12-16 LAB
HCT VFR BLD CALC: 31.5 % — LOW (ref 34.5–45)
HGB BLD-MCNC: 10.2 G/DL — LOW (ref 11.5–15.5)
MCHC RBC-ENTMCNC: 30.2 PG — SIGNIFICANT CHANGE UP (ref 27–34)
MCHC RBC-ENTMCNC: 32.4 GM/DL — SIGNIFICANT CHANGE UP (ref 32–36)
MCV RBC AUTO: 93.2 FL — SIGNIFICANT CHANGE UP (ref 80–100)
NRBC # BLD: 0 /100 WBCS — SIGNIFICANT CHANGE UP
NRBC # FLD: 0 K/UL — SIGNIFICANT CHANGE UP
PLATELET # BLD AUTO: 280 K/UL — SIGNIFICANT CHANGE UP (ref 150–400)
RBC # BLD: 3.38 M/UL — LOW (ref 3.8–5.2)
RBC # FLD: 12.5 % — SIGNIFICANT CHANGE UP (ref 10.3–14.5)
WBC # BLD: 11.38 K/UL — HIGH (ref 3.8–10.5)
WBC # FLD AUTO: 11.38 K/UL — HIGH (ref 3.8–10.5)

## 2021-12-16 RX ORDER — IBUPROFEN 200 MG
1 TABLET ORAL
Qty: 0 | Refills: 0 | DISCHARGE
Start: 2021-12-16

## 2021-12-16 RX ADMIN — Medication 975 MILLIGRAM(S): at 06:40

## 2021-12-16 RX ADMIN — Medication 1 TABLET(S): at 10:14

## 2021-12-16 RX ADMIN — Medication 975 MILLIGRAM(S): at 06:10

## 2021-12-16 RX ADMIN — Medication 600 MILLIGRAM(S): at 06:10

## 2021-12-16 RX ADMIN — Medication 600 MILLIGRAM(S): at 06:40

## 2021-12-16 RX ADMIN — HEPARIN SODIUM 5000 UNIT(S): 5000 INJECTION INTRAVENOUS; SUBCUTANEOUS at 10:14

## 2021-12-16 RX ADMIN — Medication 600 MILLIGRAM(S): at 00:26

## 2021-12-16 RX ADMIN — Medication 325 MILLIGRAM(S): at 10:14

## 2021-12-16 NOTE — DISCHARGE NOTE OB - CARE PLAN
1 Principal Discharge DX:	 delivery delivered  Assessment and plan of treatment:	 section, postop recovery, discharge to home \ Follow up in doctor's office in 2 weeks

## 2021-12-16 NOTE — DISCHARGE NOTE OB - NPI NUMBER (FOR [employer] USE ONLY) :
Medicare Screening Checklist:    Screenings  Diabetes up to date   Lipids up to date   PSA n/a  Next labs due: 6 months  Colonoscopy n/a  Eye exam due    [address] - completed    Vaccinations  Get a flu shot annually  Pneumococcus (pneumonia): up to date [[id number]]

## 2021-12-16 NOTE — DISCHARGE NOTE OB - NS MD DC FALL RISK RISK
For information on Fall & Injury Prevention, visit: [email or web address] OR  [email or web address] OR  https://www.cdc.gov/steadi/patient.html

## 2021-12-16 NOTE — DISCHARGE NOTE OB - HOSPITAL COURSE
25 yo P0 admitted for bleeding at 28+ wks  pt made cervical change to 7cm, breech  emergent LTCS 12/12- male apgars 6/9, 2lb1oz  probable abruption  bicornuate uterus noted  Postoperative course

## 2021-12-16 NOTE — PROGRESS NOTE ADULT - SUBJECTIVE AND OBJECTIVE BOX
OB Progress Note:  Delivery, POD#4    S: 27yo w/ hx of  bicornuate uterus and asthma now POD#4 s/p pLTCS @ 28w GA for breech presentation/placental abruption. Her pain is well controlled, however patient complained of "numbness" at the incision site. She is tolerating a regular diet and passing flatus. Denies N/V. Denies CP/SOB/lightheadedness/dizziness. She is ambulating without difficulty. Voiding spontaneously.     O:   Vital Signs Last 24 Hrs  T(C): 36.8 (16 Dec 2021 01:45), Max: 37.3 (15 Dec 2021 22:13)  T(F): 98.2 (16 Dec 2021 01:45), Max: 99.1 (15 Dec 2021 22:13)  HR: 75 (16 Dec 2021 01:45) (75 - 91)  BP: 108/69 (16 Dec 2021 01:45) (107/79 - 113/82)  BP(mean): --  RR: 16 (16 Dec 2021 01:45) (16 - 17)  SpO2: 98% (16 Dec 2021 01:45) (98% - 100%)        Labs:  Blood type: A Positive  Rubella IgG: RPR: Negative     Blood type: A Positive  Rubella IgG: RPR: Negative                    Blood type: A Positive  Rubella IgG: RPR: Negative                          9.9<L>   23.76<H> >-----------< 299    ( 12-13 @ 06:41 )             28.2<L>          PE:  General: NAD  Abdomen: Mildly distended, appropriately tender, incision c/d/i. Uterine fundi firm and palpated at midline  Extremities: No erythema, no pitting edema, no calf tenderness bilaterally      
Attending note   Hg improved ot 10.2 and  hct 31 WBC decreased from 20 to 11   asymptomatic anemia - chronic anemia exacerbated by blood loss   discharge to home   
Postop Day  1  s/p   C- Section    THERAPY:  [ X] Spinal morphine         Sedation Score:	  [ X] Alert	    [  ] Drowsy        [  ] Arousable	[  ] Asleep	[  ] Unresponsive    Side Effects:	  [ X] None	     [  ] Nausea        [  ] Pruritus        [  ] Weakness   [  ] Numbness        ASSESSMENT/ PLAN   Change to po prn pain meds 24 hours post Spinal Morphine.  [ x ]Documentation and Verification of current medications   
Late entry due to clinical duties  OB Progress Note:  Delivery, POD#3    S: 27yo w/ hx of  bicornuate uterus and asthma now POD#3 s/p pLTCS @ 28w GA for breech presentation/placental abruption. Her pain is well controlled, however patient complained of "numbness" at the incision site. She is tolerating a regular diet and passing flatus. Denies N/V. Denies CP/SOB/lightheadedness/dizziness. She is ambulating without difficulty. Voiding spontaneously.     O:   Vital Signs Last 24 Hrs  T(C): 36.6 (15 Dec 2021 05:29), Max: 37.2 (14 Dec 2021 21:)  T(F): 97.9 (15 Dec 2021 05:), Max: 99 (14 Dec 2021 21:)  HR: 79 (15 Dec 2021 05:) (79 - 96)  BP: 107/79 (15 Dec 2021 05:) (99/64 - 111/81)  BP(mean): --  RR: 17 (15 Dec 2021 05:29) (16 - 17)  SpO2: 100% (15 Dec 2021 05:29) (97% - 100%)        Labs:  Blood type: A Positive  Rubella IgG: RPR: Negative     Blood type: A Positive  Rubella IgG: RPR: Negative                    Blood type: A Positive  Rubella IgG: RPR: Negative                          9.9<L>   23.76<H> >-----------< 299    ( 12-13 @ 06:41 )             28.2<L>          PE:  General: NAD  Abdomen: Mildly distended, appropriately tender, incision c/d/i. Uterine fundi firm and palpated at midline  Extremities: No erythema, no pitting edema, no calf tenderness bilaterally      
OB Progress Note:  Delivery, POD#1    S: 25yo w/ hx of  bicornuate uterus and asthma now POD#1 s/p pLTCS @ 28w GA for breech presentation/placental abruption. Her pain is well controlled. She is tolerating a regular diet and passing flatus. Denies N/V. Denies CP/SOB/lightheadedness/dizziness. She is ambulating without difficulty. Voiding spontaneously.     O:   Vital Signs Last 24 Hrs  T(C): 37 (13 Dec 2021 05:45), Max: 37 (13 Dec 2021 05:45)  T(F): 98.6 (13 Dec 2021 05:45), Max: 98.6 (13 Dec 2021 05:45)  HR: 68 (13 Dec 2021 05:45) (65 - 121)  BP: 116/82 (13 Dec 2021 05:45) (92/65 - 150/89)  BP(mean): 83 (12 Dec 2021 16:45) (67 - 93)  RR: 16 (13 Dec 2021 05:45) (12 - 95)  SpO2: 100% (13 Dec 2021 05:45) (89% - 100%)      12 Dec 2021 07:01  -  13 Dec 2021 06:42  --------------------------------------------------------  IN:    Lactated Ringers: 375 mL    Magnesium Sulfate: 150 mL    Other (mL): 1700 mL    Oxytocin: 375 mL  Total IN: 2600 mL    OUT:    Blood Loss (mL): 280 mL    Indwelling Catheter - Urethral (mL): 1350 mL    Voided (mL): 1000 mL  Total OUT: 2630 mL    Total NET: -30 mL        Labs:  Blood type: A Positive  Rubella IgG: RPR: Negative                          11.0<L>   13.41<H> >-----------< 256    (  @ 10:42 )             33.4<L>    - @ 10:42      137  |  103  |  5<L>  ----------------------------<  74  4.0   |  23  |  0.48<L>        Ca    9.2      12 Dec 2021 10:42    TPro  5.9<L>  /  Alb  3.6  /  TBili  0.3  /  DBili  x   /  AST  15  /  ALT  11  /  AlkPhos  92  21 @ 10:42          PE:  General: NAD  Abdomen: Mildly distended, appropriately tender, incision c/d/i. Uterine fundi firm and palpated at midline  Extremities: No erythema, no pitting edema, no calf tenderness bilaterally      
OB Progress Note:  Delivery, POD#2    S: 25yo w/ hx of  bicornuate uterus and asthma now POD#2 s/p pLTCS @ 28w GA for breech presentation/placental abruption. Her pain is well controlled. She is tolerating a regular diet and passing flatus. Denies N/V. Denies CP/SOB/lightheadedness/dizziness. She is ambulating without difficulty. Voiding spontaneously.     O:   Vital Signs Last 24 Hrs  T(C): 36.8 (14 Dec 2021 06:11), Max: 37.4 (13 Dec 2021 21:29)  T(F): 98.3 (14 Dec 2021 06:11), Max: 99.3 (13 Dec 2021 21:29)  HR: 88 (14 Dec 2021 06:11) (65 - 88)  BP: 112/60 (14 Dec 2021 06:11) (107/79 - 112/60)  BP(mean): --  RR: 17 (14 Dec 2021 06:11) (16 - 18)  SpO2: 99% (14 Dec 2021 06:11) (98% - 100%)        Labs:  Blood type: A Positive  Rubella IgG: RPR: Negative     Blood type: A Positive  Rubella IgG: RPR: Negative                          9.9<L>   23.76<H> >-----------< 299    (  @ 06:41 )             28.2<L>                        11.0<L>   13.41<H> >-----------< 256    (  @ 10:42 )             33.4<L>    21 @ 10:42      137  |  103  |  5<L>  ----------------------------<  74  4.0   |  23  |  0.48<L>        Ca    9.2      12 Dec 2021 10:42    TPro  5.9<L>  /  Alb  3.6  /  TBili  0.3  /  DBili  x   /  AST  15  /  ALT  11  /  AlkPhos  92  21 @ 10:42          PE:  General: NAD  Abdomen: Mildly distended, appropriately tender, incision c/d/i. Uterine fundi firm and palpated at midline  Extremities: No erythema, no pitting edema, no calf tenderness bilaterally

## 2021-12-16 NOTE — PROGRESS NOTE ADULT - REASON FOR ADMISSION
Vaginal bleeding at 28 weeks

## 2021-12-16 NOTE — PROGRESS NOTE ADULT - ASSESSMENT
A/P: 25yo w/ hx of  bicornuate uterus and asthma now POD#3 s/p pLTCS @ 28w GA for breech presentation/placental abruption.  Patient is stable and doing well post-operatively.      #Asthma  -Albuterol PRN    #PP care  - Continue regular diet.  - Increase ambulation.  - Continue motrin, tylenol, oxycodone PRN for pain control.   -May consider repeating CBC w/ diff given patient's elevated WBC from 12/13, however, patient remains afebrile and incision site is c/d/i.  - Discharge planning    Beryl Gomez, PGY1  A/P: 27yo w/ hx of  bicornuate uterus and asthma now POD#3 s/p pLTCS @ 28w GA for breech presentation/placental abruption.  Patient is stable and doing well post-operatively.      #Asthma  -Albuterol PRN    #PP care  - Continue regular diet.  - Increase ambulation.  - Continue motrin, tylenol, oxycodone PRN for pain control.   -May consider repeating CBC w/ diff given patient's elevated WBC from 12/13, however, patient remains afebrile and incision site is c/d/i.  - Discharge planning    Beryl Gomez, PGY1     Attending note   exam - benign   asymptomatic anemia for Iron supplementation  CBC   discharge to home - instructions given   All her questions were answered. JAMI Phil

## 2021-12-16 NOTE — DISCHARGE NOTE OB - PATIENT PORTAL LINK FT
You can access the FollowMyHealth Patient Portal offered by [facility] by registering at the following website: [email or web address]. By joining [employer]’s FollowMyHealth portal, you will also be able to view your health information using other applications (apps) compatible with our system.

## 2021-12-16 NOTE — DISCHARGE NOTE OB - ADDITIONAL INSTRUCTIONS
No strenuous activity or anything per vagina x 8 weeks/ follow up with doctor in 2 weeks   If fever , severe pain, excessive  bleeding, problems with the incision, leg or chest pain, difficulty breathing or any other issues Please call the doctor or return to the hospital

## 2021-12-16 NOTE — DISCHARGE NOTE OB - CARE PROVIDER_API CALL
Katy Rhodes)  Obstetrics and Gynecology  [address] [place] [address]  Phone: [phone]  Fax: [phone]  Follow Up Time:

## 2021-12-16 NOTE — DISCHARGE NOTE OB - MEDICATION SUMMARY - MEDICATIONS TO TAKE
I will START or STAY ON the medications listed below when I get home from the hospital:    ibuprofen 600 mg oral tablet  -- 1 tab(s) by mouth every 6 hours  -- Indication: For Postoperative pain     albuterol 90 mcg/inh inhalation aerosol  -- 2 puff(s) inhaled every 6 hours, As needed, Shortness of Breath and/or Wheezing  -- Indication: For Asthma    ferrous sulfate 325 mg (65 mg elemental iron) oral tablet  -- 1 tab(s) by mouth once a day  -- Indication: For Anemia    Prenatal Multivitamins with Folic Acid 1 mg oral tablet  -- 1 tab(s) by mouth once a day  -- Indication: For Postpartum

## 2021-12-16 NOTE — DISCHARGE NOTE OB - TEXT 3
steri strips can be remove after 5 days - do after shower an may use rubbing alcohol on upper tips of steri strips to soften the glue

## 2021-12-23 LAB — SURGICAL PATHOLOGY STUDY: SIGNIFICANT CHANGE UP

## 2022-02-21 PROBLEM — J45.909 UNSPECIFIED ASTHMA, UNCOMPLICATED: Chronic | Status: ACTIVE | Noted: 2021-12-12

## 2022-03-11 ENCOUNTER — APPOINTMENT (OUTPATIENT)
Dept: MRI IMAGING | Facility: IMAGING CENTER | Age: 27
End: 2022-03-11

## 2022-04-01 NOTE — OB PROVIDER LABOR PROGRESS NOTE - NS_DILATION_OBGYN_ALL_OB_NU
7 [FreeTextEntry1] : Patient presents back today feeling overall better.  She continues to wear the watch but has been better about not obsessing about what her heart rates are.  Last week she did note 1 day that her heart rate got up into the 180s but she did not feel any palpitations or have any symptoms.  She has noted minimal palpitations if at all at this point.  She tried drinking more water but when she got up to a gallon said it made her feel sick so she has been drinking a little less but trying to still be better.  No other new symptoms at this time.  No dizziness or lightheadedness.  Patient denies chest pain, shortness of breath, orthopnea, presyncope, syncope.

## 2022-06-02 ENCOUNTER — APPOINTMENT (OUTPATIENT)
Dept: MRI IMAGING | Facility: CLINIC | Age: 27
End: 2022-06-02

## 2022-06-28 ENCOUNTER — OUTPATIENT (OUTPATIENT)
Dept: OUTPATIENT SERVICES | Facility: HOSPITAL | Age: 27
LOS: 1 days | End: 2022-06-28
Payer: COMMERCIAL

## 2022-06-28 ENCOUNTER — APPOINTMENT (OUTPATIENT)
Dept: MRI IMAGING | Facility: CLINIC | Age: 27
End: 2022-06-28

## 2022-06-28 DIAGNOSIS — Z90.89 ACQUIRED ABSENCE OF OTHER ORGANS: Chronic | ICD-10-CM

## 2022-06-28 DIAGNOSIS — Z98.890 OTHER SPECIFIED POSTPROCEDURAL STATES: Chronic | ICD-10-CM

## 2022-06-28 DIAGNOSIS — Z00.8 ENCOUNTER FOR OTHER GENERAL EXAMINATION: ICD-10-CM

## 2022-06-28 PROCEDURE — 72197 MRI PELVIS W/O & W/DYE: CPT | Mod: 26

## 2022-06-28 PROCEDURE — 72197 MRI PELVIS W/O & W/DYE: CPT

## 2022-06-28 PROCEDURE — A9585: CPT

## 2022-08-29 ENCOUNTER — RESULT REVIEW (OUTPATIENT)
Age: 27
End: 2022-08-29

## 2023-03-18 NOTE — OB RN TRIAGE NOTE - SUICIDE SCREENING DEPRESSION
"     Source of History:  Patient, no limitations    Chief complaint:  Back Pain (" I was here last week and doctor told my pain should go away in about a week but tit still hurts and there is a margie on my back.")      HPI:  Mirian Melo is a 58 y.o. female presenting with Back Pain (" I was here last week and doctor told my pain should go away in about a week but tit still hurts and there is a margie on my back.")       2 complaints: back pain and rash  Patient presents with complaint of back pain. This is a result of a twisting movement. Onset of pain was 1 week ago and has been stable since. The pain is located in right thoracic spine, described as sharp, stabbing, and stiffness and rated as moderate, without radiation. The patient denies incontinence, new numbness, new weakness, new tingling, perianal numbness. The patient denies other injuries. Care prior to arrival consisted of PO opiate and muscle relaxer with intermittent relief.    Rash: Patient complains of rash involving the back. Rash started a few days ago. Appearance of rash at onset: Color of lesion(s): red base, Texture of lesion(s):  papules . Rash has not changed over time Initial distribution: back.  Discomfort associated with rash: is pruritic.  Patient has not had previous treatment. Patient has identified precipitant. Patient has had new exposures (poison ivy.)      Review of Systems   Constitutional symptoms:  Negative except as documented in HPI.   Skin symptoms:  Negative except as documented in HPI.   HEENT symptoms:  Negative except as documented in HPI.   Respiratory symptoms:  Negative except as documented in HPI.   Cardiovascular symptoms:  Negative except as documented in HPI.   Gastrointestinal symptoms:  Negative except as documented in HPI.    Genitourinary symptoms:  Negative except as documented in HPI.   Musculoskeletal symptoms:  Negative except as documented in HPI.   Neurologic symptoms:  Negative except as documented in HPI. " "  Psychiatric symptoms:  Negative except as documented in HPI.   Allergy/immunologic symptoms:  Negative except as documented in HPI.             Additional review of systems information: All other systems reviewed and otherwise negative.      Review of patient's allergies indicates:   Allergen Reactions    Clindamycin     Penicillins        PMH:  As per HPI and below:    Past Medical History:   Diagnosis Date    Hypertension         No family history on file.    Past Surgical History:   Procedure Laterality Date     SECTION      x2    HERNIA REPAIR      TONSILLECTOMY         Social History     Tobacco Use    Smoking status: Every Day     Types: Cigarettes    Smokeless tobacco: Never   Substance Use Topics    Alcohol use: Not Currently       There is no problem list on file for this patient.       Physical Exam:    BP (!) 160/98 (BP Location: Left arm, Patient Position: Sitting)   Pulse 91   Temp 98.3 °F (36.8 °C) (Oral)   Resp 16   Ht 5' 6" (1.676 m)   Wt 89.8 kg (198 lb)   SpO2 100%   BMI 31.96 kg/m²     Nursing note and vital signs reviewed.    General:  Alert, no acute distress.   Skin: Normal for Ethnic Origin, No cyanosis, small rash consistent with contact dermatitis right mid back  HEENT: Normocephalic and atraumatic, Vision unchanged, Pupils symmetric, No icterus , Nasal mucosa is pink and moist  Cardiovascular:  Regular rate and rhythm, No edema  Chest Wall: No deformity, equal chest rise  Respiratory:  Lungs are clear to auscultation, respirations are non-labored.    Musculoskeletal:  No deformity, Normal perfusion to all extremities, reproducible tenderness right periscapular area with stiffness  Gastrointestinal:  Soft, Non distended  Neurological:  Alert and oriented, normal motor observed, normal speech observed.    Psychiatric:  Cooperative, appropriate mood & affect.        Labs that have been ordered have been independently reviewed and interpreted by myself.     Old Chart " Reviewed.      Initial Impression/ Differential Dx:  Muscular pain, herniated disc, spine fracture, intra-abdominal causes and urinary tract infection, dehydration.   Differential Diagnosis includes, but is not limited to:  Drug eruption, allergic reaction/urticatia, irritant/contact dermatitis, viral exanthem, local trauma/contusion, abrasion, erythema multiforme, Tinsley-Gilberto syndrome, toxic epidermal necrolysis, cellulitis, Staph scalded skin syndrome, toxic shock syndrome.       MDM:      Reviewed Nurses Note.    Reviewed Pertinent old records.    Orders Placed This Encounter    X-Ray Chest PA And Lateral    methylPREDNISolone sodium succinate injection 125 mg    predniSONE (DELTASONE) 20 MG tablet    triamcinolone acetonide 0.1% (KENALOG) 0.1 % ointment                    Labs Reviewed - No data to display       X-Ray Chest PA And Lateral   ED Interpretation   CXR Interpretation:  CXR independently interpreted by myself shows normal heart size. No infiltrate. No bony deformity. No acute disease.         Final Result      No acute abnormality.         Electronically signed by: Telly Akbar MD   Date:    03/17/2023   Time:    21:25           No visits with results within 1 Day(s) from this visit.   Latest known visit with results is:   Historical on 02/06/2020   Component Date Value Ref Range Status    FINAL CULTURE 02/06/2020 No growth of Beta Strep   Final       Imaging Results              X-Ray Chest PA And Lateral (Final result)  Result time 03/17/23 21:25:51      Final result by Telly Akbar MD (03/17/23 21:25:51)                   Impression:      No acute abnormality.      Electronically signed by: Telly Akbar MD  Date:    03/17/2023  Time:    21:25               Narrative:    EXAMINATION:  XR CHEST PA AND LATERAL    CLINICAL HISTORY:  cough;    TECHNIQUE:  PA and lateral views of the chest were performed.    COMPARISON:  None    FINDINGS:  The lungs are clear, with normal appearance of pulmonary  vasculature and no pleural effusion or pneumothorax.    The cardiac silhouette is normal in size. The hilar and mediastinal contours are unremarkable.    Bones are intact.                        Wet Read by Abner Ortega DO (03/17/23 21:25:20, [facility] - Emergency Dept, Emergency Medicine)    CXR Interpretation:  CXR independently interpreted by myself shows normal heart size. No infiltrate. No bony deformity. No acute disease.                                                                            Diagnostic Impression:    1. Allergic contact dermatitis due to plants, except food    2. Strain, back, subsequent encounter         ED Disposition Condition    Discharge Stable             Follow-up Information       [facility] - Emergency Dept.    Specialty: Emergency Medicine  Why: If symptoms worsen  Contact information:  [address]  [facility] [address]  [phone]                            ED Prescriptions       Medication Sig Dispense Start Date End Date Auth. Provider    predniSONE (DELTASONE) 20 MG tablet Take 1 tablet (20 mg total) by mouth once daily. for 5 days 5 tablet 3/17/2023 3/22/2023 Abner Ortega DO    triamcinolone acetonide 0.1% (KENALOG) 0.1 % ointment Apply topically 3 (three) times daily. 80 g 3/17/2023 -- Abner Ortega DO          Follow-up Information       Follow up With Specialties Details Why Contact Info    [facility] - Emergency Dept Emergency Medicine  If symptoms worsen [address]  [facility] [address]  [phone]             Abner Ortega DO  03/18/23 0239     Negative

## 2023-04-18 ENCOUNTER — NON-APPOINTMENT (OUTPATIENT)
Age: 28
End: 2023-04-18

## 2023-04-27 ENCOUNTER — NON-APPOINTMENT (OUTPATIENT)
Age: 28
End: 2023-04-27

## 2023-04-27 ENCOUNTER — APPOINTMENT (OUTPATIENT)
Dept: DERMATOLOGY | Facility: CLINIC | Age: 28
End: 2023-04-27
Payer: COMMERCIAL

## 2023-04-27 VITALS — HEIGHT: 62 IN | WEIGHT: 115 LBS | BODY MASS INDEX: 21.16 KG/M2

## 2023-04-27 DIAGNOSIS — D22.9 MELANOCYTIC NEVI, UNSPECIFIED: ICD-10-CM

## 2023-04-27 DIAGNOSIS — Z12.83 ENCOUNTER FOR SCREENING FOR MALIGNANT NEOPLASM OF SKIN: ICD-10-CM

## 2023-04-27 DIAGNOSIS — D18.01 HEMANGIOMA OF SKIN AND SUBCUTANEOUS TISSUE: ICD-10-CM

## 2023-04-27 DIAGNOSIS — Z86.018 PERSONAL HISTORY OF OTHER BENIGN NEOPLASM: ICD-10-CM

## 2023-04-27 PROCEDURE — 99203 OFFICE O/P NEW LOW 30 MIN: CPT

## 2023-08-29 NOTE — OB RN PATIENT PROFILE - PRO PRENATAL LABS ORI SOURCE HBSAG
hard copy, drawn during this pregnancy Hydroxychloroquine Counseling:  I discussed with the patient that a baseline ophthalmologic exam is needed at the start of therapy and every year thereafter while on therapy. A CBC may also be warranted for monitoring.  The side effects of this medication were discussed with the patient, including but not limited to agranulocytosis, aplastic anemia, seizures, rashes, retinopathy, and liver toxicity. Patient instructed to call the office should any adverse effect occur.  The patient verbalized understanding of the proper use and possible adverse effects of Plaquenil.  All the patient's questions and concerns were addressed.

## 2023-10-16 NOTE — DISCHARGE NOTE OB - NSDCQMSTAIRS_GEN_ALL_CORE
Suzan Ballard is a 67 year old female who presents for:  Chief Complaint   Patient presents with    Neurologic Problem    Consult     Meningioma - establish care with this clinic  -Last MRI 2018        Initial Vitals:  BP (!) 145/72   Pulse 64   LMP  (LMP Unknown)   SpO2 98%  Estimated body mass index is 27.93 kg/m  as calculated from the following:    Height as of 9/16/22: 5' (1.524 m).    Weight as of 10/11/23: 143 lb (64.9 kg).. There is no height or weight on file to calculate BSA. BP completed using cuff size: regular  No Pain (0)    Nursing Comments: Suzan to follow up with Primary Care provider regarding elevated blood pressure.      Anastacio Cope  
No

## 2023-11-07 NOTE — DISCHARGE NOTE OB - SHOWER/BATH WATER MAY RUN DOWN ABDOMEN INTO THE INCISION AREA.  GENTLY PAT DRY AFTER SHOWER OR BATH
History   Chief Complaint:  Fever     HPI   Carolina Noel is a 1 year old female who presents with cough, fever, and emesis.  Mother notes that the patient developed vomiting yesterday with a fever.  She does not have a fever she no longer vomits.  The vomiting is not associated with cough.  There have been no known sick contacts but the patient does go to .  She is up-to-date with immunizations but due for her 1-year-old updates as of 2 days ago on her birthday.  She has had a barky cough for the last 2 days.  No sick contacts at home.  She did receive Tylenol every 6 hours but continues to have a fever even with frequent use of Tylenol.  Mom has not been using ibuprofen.  She has been using a [place] ampicillin antibiotic given that she developed a fever.    Independent Historian:   Parent - They report entire history above given the patient is nonverbal and age.    Medications:    ibuprofen (ADVIL/MOTRIN) 100 MG/5ML suspension  acetaminophen (TYLENOL) 32 mg/mL liquid      Past Medical History:    No past medical history on file.  Past Surgical History:    No past surgical history on file.   Physical Exam   Patient Vitals for the past 24 hrs:   Temp Temp src Pulse Resp SpO2 Weight   11/07/23 0320 100.5  F (38.1  C) Temporal -- -- -- --   11/07/23 0224 99.1  F (37.3  C) Temporal -- -- -- 15 kg (33 lb 1.1 oz)   11/07/23 0220 -- -- 152 28 99 % --   General: Resting with parent.    Head:  The scalp, face, and head appear normal  Eyes:  The pupils are equal, round, and reactive to light    Conjunctivae normal  ENT:    The nose is normal    Ears/pinnae are normal    External acoustic canals are normal    Tympanic membranes are normal    The oropharynx is normal.      Uvula is in the midline.    Neck:  Normal range of motion.      There is no rigidity.  No meningismus.  CV:  Regular rate    Normal S1 and S2    No S3 or S4    No pathological murmur   Resp:  Lungs are clear.  There is a barky croup-like  cough.    There is no tachypnea; Non-labored    No rales    No wheezing   GI:  Abdomen is soft, no rigidity    No distension. No tympani. No rebound tenderness.   MS:  Normal muscular tone.      No major joint effusions.    Skin:  No rash or lesions noted.  No petechiae or purpura.  Neuro  No focal neurological deficits detected    Emergency Department Course     ECG results from 04/17/22   EKG 12 lead     Value    Systolic Blood Pressure     Diastolic Blood Pressure     Ventricular Rate 223    Atrial Rate 223    MT Interval 66    QRS Duration 174        QTc 358    P Axis 60    R AXIS 86    T Axis 0    Interpretation ECG      ** ** ** ** * Pediatric ECG Analysis * ** ** ** **  Sinus tachycardia with short MT  Non-specific intra-ventricular conduction block  Nonspecific T wave abnormality  No previous ECGs available  Confirmed by - EMERGENCY ROOM, PHYSICIAN ([id number]),  EDLANEY MARADIAGA (Janet) on 4/18/2022 6:57:53 AM       Imaging:  No orders to display      Report per radiology    Laboratory:  Labs Ordered and Resulted from Time of ED Arrival to Time of ED Departure   INFLUENZA A/B, RSV, & SARS-COV2 PCR - Normal       Result Value    Influenza A PCR Negative      Influenza B PCR Negative      RSV PCR Negative      SARS CoV2 PCR Negative       Procedures     Emergency Department Course & Assessments:       Interventions:  Medications   dexAMETHasone (DECADRON) alcohol-free oral solution 10 mg (has no administration in time range)   ibuprofen (ADVIL/MOTRIN) suspension 160 mg (160 mg Oral $Given 11/7/23 8930)        Independent Interpretation (X-rays, CTs, rhythm strip):  None    Consultations/Discussion of Management or Tests:  None        Social Determinants of Health affecting care:   Healthcare Access/Compliance and Education/Literacy    Disposition:  The patient was discharged to home.     Impression & Plan    CMS Diagnoses: None    Medical Decision Making:  Carolina Noel is a 1 year old female  presents with barky cough.  Signs and symptoms consistent with croup.  There are no signs of croup mimics such as retropharyngeal abscess, epiglottitis, bacterial tracheitis, paratonsillar abscess.  There is no indication at this point for advanced imaging or neck xrays/chest xrays.  No signs of serious bacterial infection at this point with a well-appearing, normally immunized child.  Decadron given here in ED. Croup discharge issues discussed with mother.  Of note the mother has only been using Tylenol as an antipyretic.  We discussed the use of both Tylenol and ibuprofen to help ensure fever reduction.    There is no stridor noted.  No epinephrine neb needed at this point.  Close followup with pediatrician per discharge orders.    Due to language barrier, an  was present during the history-taking and subsequent discussion (and for part of the physical exam) with this patient.    Diagnosis:    ICD-10-CM    1. Croup  J05.0       2. Fever in pediatric patient  R50.9            Discharge Medications:  New Prescriptions    IBUPROFEN (ADVIL/MOTRIN) 100 MG/5ML SUSPENSION    Take 8 mLs (160 mg) by mouth every 6 hours as needed for fever or mild pain      11/7/2023   Aleksey Mccabe MD White, Scott, MD  11/07/23 0358     Statement Selected

## 2024-10-11 ENCOUNTER — OFFICE VISIT (OUTPATIENT)
Dept: INTERNAL MEDICINE | Facility: CLINIC | Age: 29
End: 2024-10-11
Payer: COMMERCIAL

## 2024-10-11 VITALS
BODY MASS INDEX: 25.4 KG/M2 | SYSTOLIC BLOOD PRESSURE: 100 MMHG | HEIGHT: 62 IN | OXYGEN SATURATION: 98 % | TEMPERATURE: 98.1 F | DIASTOLIC BLOOD PRESSURE: 80 MMHG | HEART RATE: 86 BPM | WEIGHT: 138 LBS

## 2024-10-11 DIAGNOSIS — Z00.00 ANNUAL PHYSICAL EXAM: Primary | ICD-10-CM

## 2024-10-11 DIAGNOSIS — Z12.4 PAP SMEAR FOR CERVICAL CANCER SCREENING: ICD-10-CM

## 2024-10-11 DIAGNOSIS — Z13.220 SCREENING CHOLESTEROL LEVEL: ICD-10-CM

## 2024-10-11 DIAGNOSIS — R79.9 ABNORMAL BLOOD CHEMISTRY: ICD-10-CM

## 2024-10-11 DIAGNOSIS — Z11.59 ENCOUNTER FOR HEPATITIS C SCREENING TEST FOR LOW RISK PATIENT: ICD-10-CM

## 2024-10-11 PROCEDURE — 99385 PREV VISIT NEW AGE 18-39: CPT | Performed by: STUDENT IN AN ORGANIZED HEALTH CARE EDUCATION/TRAINING PROGRAM

## 2024-10-11 NOTE — PROGRESS NOTES
Subjective   Moriah Casey is a 28 y.o. female.     History of Present Illness  Patient presents to establish care for annual exam  Vaccines not up-to-date include flu COVID tetanus  Hep C screening due  Pap smear due  Blood work due  Patient denies any current complaints states that she has been feeling well.  No past medical history other than tonsillectomy, D&C, emergency .  Patient does state that she would like her hormones checked and referral to OB for Pap smear      The following portions of the patient's history were reviewed and updated as appropriate: allergies, current medications, past family history, past medical history, past social history, past surgical history, and problem list.    Review of Systems   All other systems reviewed and are negative.      Objective   Physical Exam  Vitals and nursing note reviewed.   Constitutional:       Appearance: Normal appearance.   HENT:      Head: Normocephalic and atraumatic.      Right Ear: External ear normal.      Left Ear: External ear normal.      Nose: Nose normal.      Mouth/Throat:      Mouth: Mucous membranes are moist.      Pharynx: Oropharynx is clear. No oropharyngeal exudate or posterior oropharyngeal erythema.   Eyes:      Extraocular Movements: Extraocular movements intact.      Conjunctiva/sclera: Conjunctivae normal.      Pupils: Pupils are equal, round, and reactive to light.   Cardiovascular:      Rate and Rhythm: Normal rate and regular rhythm.      Pulses: Normal pulses.      Heart sounds: Normal heart sounds.   Pulmonary:      Effort: Pulmonary effort is normal.      Breath sounds: Normal breath sounds.   Abdominal:      General: Abdomen is flat. Bowel sounds are normal.      Palpations: Abdomen is soft.   Musculoskeletal:         General: Normal range of motion.      Cervical back: Normal range of motion.   Skin:     General: Skin is warm.      Capillary Refill: Capillary refill takes less than 2 seconds.   Neurological:       General: No focal deficit present.      Mental Status: She is alert and oriented to person, place, and time. Mental status is at baseline.   Psychiatric:         Mood and Affect: Mood normal.         Behavior: Behavior normal.         Thought Content: Thought content normal.         Judgment: Judgment normal.         Assessment & Plan   Diagnoses and all orders for this visit:    1. Annual physical exam (Primary)  -     CBC (No Diff)  -     Comprehensive Metabolic Panel  -     Hemoglobin A1c  -     Hepatitis C Antibody  -     Lipid Panel  -     Progesterone  -     Luteinizing Hormone  -     Estradiol  -     Follicle Stimulating Hormone  -     Sex Horm Binding Globulin  -     Testosterone, Free, Total    2. Screening cholesterol level  -     CBC (No Diff)  -     Comprehensive Metabolic Panel  -     Hemoglobin A1c  -     Hepatitis C Antibody  -     Lipid Panel  -     Progesterone  -     Luteinizing Hormone  -     Estradiol  -     Follicle Stimulating Hormone  -     Sex Horm Binding Globulin  -     Testosterone, Free, Total    3. Pap smear for cervical cancer screening  -     Ambulatory Referral to Obstetrics / Gynecology    4. Encounter for hepatitis C screening test for low risk patient  -     Hepatitis C Antibody    5. Abnormal blood chemistry  -     CBC (No Diff)  -     Comprehensive Metabolic Panel  -     Hemoglobin A1c  -     Hepatitis C Antibody  -     Lipid Panel  -     Progesterone  -     Luteinizing Hormone  -     Estradiol  -     Follicle Stimulating Hormone  -     Sex Horm Binding Globulin  -     Testosterone, Free, Total       Counseled on heart healthy diet  Counseled on exercise 30 minutes a day 5 days a week  Follow with optometry once a year  Follow with dentistry twice a year  Brush and floss twice a day  Wear seatbelt while in a car  BMI is >= 25 and <30. (Overweight) The following options were offered after discussion;: exercise counseling/recommendations and nutrition  counseling/recommendations

## 2024-10-12 LAB
ALBUMIN SERPL-MCNC: 4.4 G/DL (ref 3.5–5.2)
ALBUMIN/GLOB SERPL: 1.9 G/DL
ALP SERPL-CCNC: 61 U/L (ref 39–117)
ALT SERPL-CCNC: 14 U/L (ref 1–33)
AST SERPL-CCNC: 19 U/L (ref 1–32)
BILIRUB SERPL-MCNC: 0.2 MG/DL (ref 0–1.2)
BUN SERPL-MCNC: 14 MG/DL (ref 6–20)
BUN/CREAT SERPL: 20 (ref 7–25)
CALCIUM SERPL-MCNC: 9.6 MG/DL (ref 8.6–10.5)
CHLORIDE SERPL-SCNC: 107 MMOL/L (ref 98–107)
CHOLEST SERPL-MCNC: 142 MG/DL (ref 0–200)
CO2 SERPL-SCNC: 27.7 MMOL/L (ref 22–29)
CREAT SERPL-MCNC: 0.7 MG/DL (ref 0.57–1)
EGFRCR SERPLBLD CKD-EPI 2021: 119.5 ML/MIN/1.73
ERYTHROCYTE [DISTWIDTH] IN BLOOD BY AUTOMATED COUNT: 11.7 % (ref 12.3–15.4)
ESTRADIOL SERPL-MCNC: 63.5 PG/ML
FSH SERPL-ACNC: 5.6 MIU/ML
GLOBULIN SER CALC-MCNC: 2.3 GM/DL
GLUCOSE SERPL-MCNC: 89 MG/DL (ref 65–99)
HBA1C MFR BLD: 4.9 % (ref 4.8–5.6)
HCT VFR BLD AUTO: 38.3 % (ref 34–46.6)
HCV IGG SERPL QL IA: NON REACTIVE
HDLC SERPL-MCNC: 63 MG/DL (ref 40–60)
HGB BLD-MCNC: 13.4 G/DL (ref 12–15.9)
LDLC SERPL CALC-MCNC: 65 MG/DL (ref 0–100)
LH SERPL-ACNC: 7.7 MIU/ML
MCH RBC QN AUTO: 30.8 PG (ref 26.6–33)
MCHC RBC AUTO-ENTMCNC: 35 G/DL (ref 31.5–35.7)
MCV RBC AUTO: 88 FL (ref 79–97)
PLATELET # BLD AUTO: 335 10*3/MM3 (ref 140–450)
POTASSIUM SERPL-SCNC: 4.7 MMOL/L (ref 3.5–5.2)
PROGEST SERPL-MCNC: 0.1 NG/ML
PROT SERPL-MCNC: 6.7 G/DL (ref 6–8.5)
RBC # BLD AUTO: 4.35 10*6/MM3 (ref 3.77–5.28)
SHBG SERPL-SCNC: 43 NMOL/L (ref 24.6–122)
SODIUM SERPL-SCNC: 145 MMOL/L (ref 136–145)
TESTOST FREE SERPL-MCNC: 2 PG/ML (ref 0–4.2)
TESTOST SERPL-MCNC: 32 NG/DL (ref 13–71)
TRIGL SERPL-MCNC: 70 MG/DL (ref 0–150)
VLDLC SERPL CALC-MCNC: 14 MG/DL (ref 5–40)
WBC # BLD AUTO: 7.35 10*3/MM3 (ref 3.4–10.8)

## 2024-10-14 NOTE — PROGRESS NOTES
Labs overall normal.  Hormones show that she is likely in follicular stage of her cycle, other hormones are normal.  Patient should be contacted to schedule with gynecology.

## 2024-10-23 NOTE — DISCHARGE NOTE OB - HAS THE PATIENT RECEIVED THE INFLUENZA VACCINE THIS SEASON?
JORDAN (acute kidney injury) JORDAN (acute kidney injury) Nausea with vomiting Nausea with vomiting Nausea with vomiting Nausea with vomiting JORDAN (acute kidney injury) JORDAN (acute kidney injury) JORDAN (acute kidney injury) no...

## 2024-12-31 ENCOUNTER — OFFICE VISIT (OUTPATIENT)
Dept: OBSTETRICS AND GYNECOLOGY | Facility: CLINIC | Age: 29
End: 2024-12-31
Payer: COMMERCIAL

## 2024-12-31 VITALS
BODY MASS INDEX: 24.92 KG/M2 | WEIGHT: 135.4 LBS | HEIGHT: 62 IN | SYSTOLIC BLOOD PRESSURE: 100 MMHG | DIASTOLIC BLOOD PRESSURE: 76 MMHG

## 2024-12-31 DIAGNOSIS — Z01.419 WELL WOMAN EXAM: Primary | ICD-10-CM

## 2024-12-31 DIAGNOSIS — Z12.39 ENCOUNTER FOR BREAST CANCER SCREENING USING NON-MAMMOGRAM MODALITY: ICD-10-CM

## 2024-12-31 RX ORDER — AZITHROMYCIN 250 MG/1
TABLET, FILM COATED ORAL
COMMUNITY
End: 2024-12-31

## 2024-12-31 RX ORDER — VENLAFAXINE HYDROCHLORIDE 150 MG/1
CAPSULE, EXTENDED RELEASE ORAL
COMMUNITY
End: 2024-12-31

## 2024-12-31 RX ORDER — VENLAFAXINE HYDROCHLORIDE 75 MG/1
CAPSULE, EXTENDED RELEASE ORAL
COMMUNITY
End: 2024-12-31

## 2024-12-31 NOTE — PROGRESS NOTES
Annual Well Woman Visit    Subjective   Chief Complaint   Patient presents with    Gynecologic Exam     Pap smear done today.      Moriah Casey is a 29 y.o.  presenting to be seen for an annual well woman visit. She reports a history of cystic changes in her breasts with prior biopsies being benign. She is having monthly cycles with 4 days of bleeding with normal flow. She reports some cramping on day 1 but not severe. She is not currently on any hormonal contraception. She has been in the past and did not have concerns. She does not desire to restart any contraception currently. She denies a history of STIs or changes in vaginal discharge currently. She denies sexual dysfunction. She denies urinary complaints including incontinence.    OB Hx:   OB History    Para Term  AB Living   2 1   1 1 1   SAB IAB Ectopic Molar Multiple Live Births   1         1      # Outcome Date GA Lbr Dewayne/2nd Weight Sex Type Anes PTL Lv   2  21 28w5d  936 g (2 lb 1 oz) M CS-LTranv   ANA ROSA   2014              Contraception: none  Pap smear: 2-3 years ago, denies h/o abnormal  Mammogram: N/A  Colonoscopy: N/A  DEXA Scan: N/A    Past Medical History:   Diagnosis Date    Bicornuate uterus      Past Surgical History:   Procedure Laterality Date     SECTION      D & C WITH SUCTION  2016    TONSILLECTOMY       Family History   Problem Relation Age of Onset    Lung cancer Father     Leukemia Mother         CLL     Social History     Tobacco Use    Smoking status: Never     Passive exposure: Never    Smokeless tobacco: Never   Vaping Use    Vaping status: Never Used   Substance Use Topics    Alcohol use: Never    Drug use: Never     (Not in a hospital admission)    Patient has no known allergies.  No current outpatient medications on file prior to visit.     No current facility-administered medications on file prior to visit.     Social History    Tobacco Use      Smoking status: Never         "Passive exposure: Never      Smokeless tobacco: Never    Review of Systems  Pertinent items are noted in HPI, all other systems were reviewed and negative       Objective   /76   Ht 157.5 cm (62\")   Wt 61.4 kg (135 lb 6.4 oz)   LMP 12/23/2024 (Exact Date)   BMI 24.76 kg/m²     Physical Exam:  General Appearance: alert, interactive, and cooperative  Breasts:   Examined in supine position  Symmetric without hard/ fixed masses or skin dimpling; scattered cystic changes, mobile nodules  Nipples normal without inversion, lesions or discharge  There are no palpable axillary nodes  Abdomen: no masses, soft, non-tender, no guarding and no rebound tenderness  Pelvis:  Pelvic: Clinical staff was present for exam  External genitalia:  normal appearance of the external genitalia including Bartholin's and [place]'s glands  :  urethral meatus normal  Vagina:  normal pink mucosa without lesions  Cervix:  normal appearance, ectropion present, no lesions  Uterus:  normal size, shape and consistency, non-tender  Adnexa:  normal bimanual exam of the adnexa, no masses or tenderness     Assessment & Plan    Annual well woman exam with age appropriate screening      Diagnosis Plan   1. Well woman exam  LIQUID-BASED PAP SMEAR WITH HPV GENOTYPING REGARDLESS OF INTERPRETATION ([facility])      2. Encounter for breast cancer screening using non-mammogram modality           Medications ordered: None    Procedures performed: Pap    - Mammogram: Not indicated   - Pap screening guidelines reviewed; pap smear completed  - Yearly clinical breast and pelvic exams recommended regardless of pap recommendations  - Dexa scan: not indicated   - Colonoscopy: not indicated   - Healthy diet and exercise encouraged  - Contraception: none    Follow up for annual visit or sooner with any concerns.    Shyann Louis MD  Obstetrics and Gynecology  [facility]  "

## 2025-01-02 ENCOUNTER — PATIENT ROUNDING (BHMG ONLY) (OUTPATIENT)
Dept: OBSTETRICS AND GYNECOLOGY | Facility: CLINIC | Age: 30
End: 2025-01-02
Payer: COMMERCIAL

## 2025-01-02 NOTE — PROGRESS NOTES
January 2, 2025    Hello, may I speak with Moriah Casey?    My name is Jaclyn      I am  with [facility]  [address], [address]  [facility] [address] [phone].    Before we get started may I verify your date of birth? 1995    I am calling to officially welcome you to our practice and ask about your recent visit. Is this a good time to talk? yes    Tell me about your visit with us. What things went well?  Patient states that everything went perfect and that everyone was very nice.       We're always looking for ways to make our patients' experiences even better. Do you have recommendations on ways we may improve?  no    Overall were you satisfied with your first visit to our practice? yes       I appreciate you taking the time to speak with me today. Is there anything else I can do for you? no      Thank you, and have a great day.

## 2025-01-03 LAB — REF LAB TEST METHOD: NORMAL

## 2025-02-17 NOTE — OB RN PATIENT PROFILE - CONTRAINDICATIONS & PRECAUTIONS (SELECT ALL THAT APPLY)
[facility] [address], [address]  [facility] [address]  Dept: [phone]  Dept Fax: [phone]  Loc: [phone]      Adriane Gunderson is a 54 y.o. female who presents today for Follow-up (Iron deficiency anemia, unspecified iron deficiency anemia type)      HPI:     Adriane Gunderson is a 54-year-old -American female with PMH of diabetes, hypothyroidism, CHF who was referred by her gastroenterologist for anemia and elevated ferritin.  Ferritin on 11/5/2024 was 1042 and on 8/5/2023 it was 751.  She had HFE testing which was negative.  Iron studies showed normal iron and iron saturation and TIBC.  FibroTest on the liver showed no fibrosis.  She had SPEP in 2023 with no monoclonal bands and immunofixation in March 2024 with no monoclonal bands.    She states she has been fatigued, had some headaches, has chronic shortness of breath which she feels has worsened, has had diarrhea with generalized abdominal pain however she was not tender to palpation.  She denies any bleeding of gums, epistaxis, hematuria, hematochezia, melena.  She has not started any new medications.  She denies any recent infections or surgeries or procedures. She states she has only had transfusions once in 2021 for a bleeding ulcer.  She denies any unintentional weight loss, night sweats.    I discussed causes of elevated ferritin including infection/inflammation, hereditary hemochromatosis, liver disorders, iron overload from repeated transfusions, or malignancies.  We discussed we would get labs to try to determine the source of the iron overload.  We also discussed that she likely will need phlebotomy to lower the ferritin levels.  She expressed understanding of the plan and I answered all her questions.      The patient has No Known Allergies.    Past Medical History  Adriane  has a past medical history of Diabetes mellitus (HCC), Hyperlipidemia, Hypertension, Irregular heart rhythm, and 
none...

## 2025-04-15 ENCOUNTER — TELEPHONE (OUTPATIENT)
Dept: INTERNAL MEDICINE | Facility: CLINIC | Age: 30
End: 2025-04-15
Payer: COMMERCIAL

## 2025-04-15 DIAGNOSIS — F41.1 GAD (GENERALIZED ANXIETY DISORDER): Primary | ICD-10-CM

## 2025-04-15 NOTE — TELEPHONE ENCOUNTER
Caller: Moriah Casey    Relationship: Self    Best call back number: [phone]     What is the medical concern/diagnosis: ANXIETY    What specialty or service is being requested: BEHAVIORAL HEALTH    What is the provider, practice or medical service name: ANY    What is the office location:     What is the office phone number:     Any additional details: PHONE CALL PLEASE

## 2025-06-03 ENCOUNTER — OFFICE VISIT (OUTPATIENT)
Dept: BEHAVIORAL HEALTH | Facility: CLINIC | Age: 30
End: 2025-06-03
Payer: COMMERCIAL

## 2025-06-03 DIAGNOSIS — F41.9 ANXIETY: Primary | ICD-10-CM
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
## 2025-06-09 ENCOUNTER — OFFICE VISIT (OUTPATIENT)
Age: 30
End: 2025-06-09
Payer: COMMERCIAL

## 2025-06-09 DIAGNOSIS — F41.9 ANXIETY: Primary | ICD-10-CM

## 2025-06-09 PROCEDURE — 90834 PSYTX W PT 45 MINUTES: CPT | Performed by: COUNSELOR

## 2025-06-09 NOTE — PROGRESS NOTES
Follow Up Note       Date Encounter: 2025   Name: Moriah Casey  MRN: [id number]  : 1995    Time In: 09:43  Time Out: 10:34     Referring Provider: Dahlia Silverman DO    Chief Complaint: (F41.9) Anxiety     History of Present Illness:   Moriah Casey is a 29 y.o. female who is being seen today for follow up for individual Psychotherapy session. Therapist provided informed consent to patient explaining that confidentiality cannot be maintained if patient states intent, thought or plan to harm themself, someone else or if someone had harmed or plans to harm them. Patient stated understanding and consented for treatment.  Patient arrived on time for appointment. Patient was dressed appropriately. Therapist used a person centered approach with today's appointment. Therapist and patient reflected on past week events. Therapist provided a safe space for patient to express thoughts and emotions. Therapist provided psychoedcuation on healthy boundaries and communication. Therapist and patient worked on self care. Therapist provided resources for healthy communication and self care.       Subjective     Assessment Scores:   PHQ-9 Depression Screening  Little interest or pleasure in doing things? Not at all   Feeling down, depressed, or hopeless? Not at all   PHQ-2 Total Score 0   Trouble falling or staying asleep, or sleeping too much? Not at all   Feeling tired or having little energy? Several days   Poor appetite or overeating? Not at all   Feeling bad about yourself - or that you are a failure or have let yourself or your family down? Not at all   Trouble concentrating on things, such as reading the newspaper or watching television? Not at all   Moving or speaking so slowly that other people could have noticed? Or the opposite - being so fidgety or restless that you have been moving around a lot more than usual? Not at all     Thoughts that you would be better off dead, or of hurting  yourself in some way? Not at all   PHQ-9 Total Score 1   If you checked off any problems, how difficult have these problems made it for you to do your work, take care of things at home, or get along with other people? Not difficult at all       FERNANDA-7  Feeling nervous, anxious or on edge: Several days  Not being able to stop or control worrying: Several days  Worrying too much about different things: Several days  Trouble Relaxing: Several days  Being so restless that it is hard to sit still: Several days  Feeling afraid as if something awful might happen: Several days  Becoming easily annoyed or irritable: More than half the days  FERNANDA 7 Total Score: 8  If you checked any problems, how difficult have these problems made it for you to do your work, take care of things at home, or get along with other people: Somewhat difficult    Patient's Support Network Includes:  significant other and mother    Medications:   No current outpatient medications on file.    Allergies:   No Known Allergies     Objective       Mental Status Exam:   MENTAL STATUS EXAM   General Appearance:  Cleanly groomed and dressed  Eye Contact:  Good eye contact  Attitude:  Cooperative  Motor Activity:  Normal gait, posture  Muscle Strength:  Normal  Speech:  Normal rate, tone, volume  Language:  Other  Other Comment:  Appropriate  Mood and affect:  Normal, pleasant  Thought Process:  Logical and goal-directed  Associations/ Thought Content:  No delusions  Suicidal Ideations:  Not present  Homicidal Ideation:  Not present  Sensorium:  Alert and clear  Immediate Recall, Recent, and Remote Memory:  Intact  Attention Span/ Concentration:  Good  Fund of Knowledge:  Appropriate for age and educational level  Intellectual Functioning:  Average range  Insight:  Good  Judgement:  Good  Reliability:  Good  Impulse Control:  Good       Assessment / Plan      Visit Diagnosis/Orders Placed This Visit:    ICD-10-CM ICD-9-CM   1. Anxiety  F41.9 300.00        PLAN:      Prognosis: Good with ongoing treatment    Safety: Patient denies SI/HI.  Therapist and patient reviewed options for help including 911, 988 the suicide hotline, and going to the neared ER.  Therapist will continue to monitor.   Risk Assessment: Risk of self-harm acutely is low. Risk of self-harm chronically is also low, but could be further elevated in the event of treatment noncompliance and/or AODA.    Treatment Plan/Goals: Continue supportive psychotherapy efforts and medications as indicated. Treatment and medication options discussed during today's visit. Patient acknowledged and verbally consented to continue with current treatment plan and was educated on the importance of compliance with treatment and follow-up appointments. Patient seems reasonably able to adhere to treatment plan.      Assisted Patient in processing above session content; acknowledged and normalized patient’s thoughts, feelings, and concerns.  R    Allowed Patient to freely discuss issues  without interruption or judgement with unconditional positive regard, active listening skills, and empathy. Therapist provided a safe, confidential environment to facilitate the development of a positive therapeutic relationship and encouraged open, honest communication. Assisted Patient in identifying risk factors which would indicate the need for higher level of care including thoughts to harm self or others and/or self-harming behavior and encouraged Patient to contact this office, call [phone], or present to the nearest emergency room should any of these events occur. Discussed crisis intervention services and means to access. Patient adamantly and convincingly denies current suicidal or homicidal ideation or perceptual disturbance. Assisted Patient in processing session content; acknowledged and normalized Patient’s thoughts, feelings, and concerns by utilizing a person-centered approach in efforts to build appropriate rapport and a positive  therapeutic relationship with open and honest communication.     Follow Up:   Return in about 1 week (around 6/16/2025).       SEAN Morrow   [facility]

## 2025-06-16 ENCOUNTER — OFFICE VISIT (OUTPATIENT)
Age: 30
End: 2025-06-16
Payer: COMMERCIAL

## 2025-06-16 DIAGNOSIS — F41.9 ANXIETY: Primary | ICD-10-CM

## 2025-06-16 NOTE — PROGRESS NOTES
Follow Up Note       Date Encounter: 2025   Name: Moriah Casey  MRN: [id number]  : 1995    Time In: 09:50  Time Out: 1030     Referring Provider: Dahlia Silverman DO    Chief Complaint: (F41.9) Anxiety     History of Present Illness:   Moriah Casey is a 29 y.o. female who is being seen today for follow up for individual Psychotherapy session. Therapist provided informed consent to patient explaining that confidentiality cannot be maintained if patient states intent, thought or plan to harm themself, someone else or if someone had harmed or plans to harm them. Patient stated understanding and consented for treatment.  Patient arrived on time. Patient is dressed appropriately  . Therapist used a Person Centered Approach with today's appointment. Patient was able to express  thoughts and emotions. Patient and therapist worked on mindfulness skills to use as she is on her trip. Patient denies any suicidal thoughts plans or intent. Patient reported anxiety is at her baseline.       Subjective     Assessment Scores:   PHQ-9 Depression Screening  Little interest or pleasure in doing things? Not at all   Feeling down, depressed, or hopeless? Not at all   PHQ-2 Total Score 0   Trouble falling or staying asleep, or sleeping too much? Not at all   Feeling tired or having little energy? Several days   Poor appetite or overeating? Not at all   Feeling bad about yourself - or that you are a failure or have let yourself or your family down? Not at all   Trouble concentrating on things, such as reading the newspaper or watching television? Not at all   Moving or speaking so slowly that other people could have noticed? Or the opposite - being so fidgety or restless that you have been moving around a lot more than usual? Not at all     Thoughts that you would be better off dead, or of hurting yourself in some way? Not at all   PHQ-9 Total Score 1   If you checked off any problems, how difficult  have these problems made it for you to do your work, take care of things at home, or get along with other people? Not difficult at all       FERNANDA-7  Feeling nervous, anxious or on edge: Several days  Not being able to stop or control worrying: Not at all  Worrying too much about different things: Several days  Trouble Relaxing: Not at all  Being so restless that it is hard to sit still: Not at all  Feeling afraid as if something awful might happen: Not at all  Becoming easily annoyed or irritable: Not at all  FERNANDA 7 Total Score: 2  If you checked any problems, how difficult have these problems made it for you to do your work, take care of things at home, or get along with other people: Not difficult at all    Patient's Support Network Includes:  significant other and mother    Medications:   No current outpatient medications on file.    Allergies:   No Known Allergies     Objective       Mental Status Exam:   MENTAL STATUS EXAM   General Appearance:  Cleanly groomed and dressed  Eye Contact:  Good eye contact  Attitude:  Cooperative  Motor Activity:  Normal gait, posture  Muscle Strength:  Normal  Speech:  Normal rate, tone, volume  Language:  Other  Other Comment:  Appropriate  Mood and affect:  Normal, pleasant  Hopelessness:  Denies  Loneliness: Denies  Thought Process:  Logical and goal-directed  Associations/ Thought Content:  No delusions  Hallucinations:  None  Suicidal Ideations:  Not present  Homicidal Ideation:  Not present  Sensorium:  Alert and clear  Orientation:  Person, time and place  Immediate Recall, Recent, and Remote Memory:  Intact  Attention Span/ Concentration:  Good  Fund of Knowledge:  Appropriate for age and educational level  Intellectual Functioning:  Average range  Insight:  Good  Judgement:  Good  Reliability:  Good  Impulse Control:  Good       Assessment / Plan      Visit Diagnosis/Orders Placed This Visit:    ICD-10-CM ICD-9-CM   1. Anxiety  F41.9 300.00        PLAN:     Prognosis:  Good with ongoing treatment    Safety: Patient denies SI/HI.  Therapist and patient reviewed options for help including 911, 988 the suicide hotline, and going to the neared ER.  Therapist will continue to monitor.   Risk Assessment: Risk of self-harm acutely is low. Risk of self-harm chronically is also low, but could be further elevated in the event of treatment noncompliance and/or AODA.    Treatment Plan/Goals: Continue supportive psychotherapy efforts and medications as indicated. Treatment and medication options discussed during today's visit. Patient acknowledged and verbally consented to continue with current treatment plan and was educated on the importance of compliance with treatment and follow-up appointments. Patient seems reasonably able to adhere to treatment plan.      Assisted Patient in processing above session content; acknowledged and normalized patient’s thoughts, feelings, and concerns.     Allowed Patient to freely discuss issues  without interruption or judgement with unconditional positive regard, active listening skills, and empathy. Therapist provided a safe, confidential environment to facilitate the development of a positive therapeutic relationship and encouraged open, honest communication. Assisted Patient in identifying risk factors which would indicate the need for higher level of care including thoughts to harm self or others and/or self-harming behavior and encouraged Patient to contact this office, call 911, [phone], or present to the nearest emergency room should any of these events occur. Discussed crisis intervention services and means to access. Patient adamantly and convincingly denies current suicidal or homicidal ideation or perceptual disturbance. Assisted Patient in processing session content; acknowledged and normalized Patient’s thoughts, feelings, and concerns by utilizing a person-centered approach in efforts to build appropriate rapport and a positive therapeutic  relationship with open and honest communication.     Follow Up:   Return Will schedule when back in [place] from her trip.       Julius Casas, DELMYC   [facility]

## 2025-07-30 ENCOUNTER — OFFICE VISIT (OUTPATIENT)
Age: 30
End: 2025-07-30
Payer: COMMERCIAL

## 2025-07-30 DIAGNOSIS — F43.9 TRAUMA AND STRESSOR-RELATED DISORDER: Primary | ICD-10-CM

## 2025-07-30 NOTE — PROGRESS NOTES
Follow Up Note       Date Encounter: 2025   Name: Moriah Casey  MRN: [id number]  : 1995    Time In: 10:28  Time Out: 11:10     Referring Provider: Dahlia Silverman DO    Chief Complaint: (F43.9) Trauma and stressor-related disorder     History of Present Illness:   Moriah Casey is a 29 y.o. female who is being seen today for follow up for individual Psychotherapy session. Therapist provided informed consent to patient explaining that confidentiality cannot be maintained if patient states intent, thought or plan to harm themself, someone else or if someone had harmed or plans to harm them. Patient stated understanding and consented for treatment.  Patient arrived on time. Patient is dressed appropriately . Therapist used a Person Centered Approach with today's appointment. Patient was able to express  thoughts and emotions. Patient arrived at appointment did not want to talk about current events. Patient did report breakup with significant other. Patient also discussed separation anxiety between her and her son. Patient denies having suicidal thoughts plans or intent.     Subjective     Assessment Scores:   PHQ-9 Depression Screening  Little interest or pleasure in doing things? Several days   Feeling down, depressed, or hopeless? Several days   PHQ-2 Total Score 2   Trouble falling or staying asleep, or sleeping too much? Not at all   Feeling tired or having little energy? More than half the days   Poor appetite or overeating? More than half the days   Feeling bad about yourself - or that you are a failure or have let yourself or your family down? Several days   Trouble concentrating on things, such as reading the newspaper or watching television? Not at all   Moving or speaking so slowly that other people could have noticed? Or the opposite - being so fidgety or restless that you have been moving around a lot more than usual? Not at all     Thoughts that you would be better  off dead, or of hurting yourself in some way? Not at all   PHQ-9 Total Score 7   If you checked off any problems, how difficult have these problems made it for you to do your work, take care of things at home, or get along with other people? Somewhat difficult       FERNANDA-7  Feeling nervous, anxious or on edge: More than half the days  Not being able to stop or control worrying: More than half the days  Worrying too much about different things: More than half the days  Trouble Relaxing: More than half the days  Being so restless that it is hard to sit still: Several days  Feeling afraid as if something awful might happen: Several days  Becoming easily annoyed or irritable: More than half the days  FERNANDA 7 Total Score: 12  If you checked any problems, how difficult have these problems made it for you to do your work, take care of things at home, or get along with other people: Somewhat difficult    Patient's Support Network Includes:  mom, but limited as mom lives out of state    Medications:   No current outpatient medications on file.    Allergies:   No Known Allergies     Objective       Mental Status Exam:   MENTAL STATUS EXAM   General Appearance:  Cleanly groomed and dressed  Eye Contact:  Good eye contact  Attitude:  Cooperative  Motor Activity:  Normal gait, posture  Muscle Strength:  Normal  Speech:  Normal rate, tone, volume  Language:  Other  Other Comment:  Appropriate  Mood and affect:  Mood congruent and other  Other Comment:  Sad  Thought Process:  Logical and goal-directed  Associations/ Thought Content:  No delusions  Hallucinations:  None  Suicidal Ideations:  Not present  Homicidal Ideation:  Not present  Sensorium:  Alert and clear  Orientation:  Person, place and time  Immediate Recall, Recent, and Remote Memory:  Intact  Attention Span/ Concentration:  Good  Fund of Knowledge:  Appropriate for age and educational level  Intellectual Functioning:  Average range  Insight:  Good  Judgement:   Good  Reliability:  Good  Impulse Control:  Good       Assessment / Plan      Visit Diagnosis/Orders Placed This Visit:    ICD-10-CM ICD-9-CM   1. Trauma and stressor-related disorder  F43.9 309.81     308.9        PLAN:     Prognosis: Good with ongoing treatment    Safety: Patient denies SI/HI.  Therapist and patient reviewed options for help including 911, 988 the suicide hotline, and going to the neared ER.  Therapist will continue to monitor.   Risk Assessment: Risk of self-harm acutely is low. Risk of self-harm chronically is also low, but could be further elevated in the event of treatment noncompliance and/or AODA.    Treatment Plan/Goals: Continue supportive psychotherapy efforts and medications as indicated. Treatment and medication options discussed during today's visit. Patient acknowledged and verbally consented to continue with current treatment plan and was educated on the importance of compliance with treatment and follow-up appointments. Patient seems reasonably able to adhere to treatment plan.      Assisted client in processing above session content; acknowledged and normalized client’s thoughts, feelings, and concerns.    Examined patterns of thought and behavior related to specific challenges, particularly focusing on separation anxiety with son . Therapist provided psycho education on psycho social development stages   Client responded to look further into building son's development  by doing more social engagements at the library and possible [employer] involvement.   Therapist reviewed and updated the treatment plan in response to external factors impacting the client's progress, especially those related to past trauma experience and current experiences. .     Allowed Patient to freely discuss issues  without interruption or judgement with unconditional positive regard, active listening skills, and empathy. Therapist provided a safe, confidential environment to facilitate the development of a  positive therapeutic relationship and encouraged open, honest communication. Assisted Patient in identifying risk factors which would indicate the need for higher level of care including thoughts to harm self or others and/or self-harming behavior and encouraged Patient to contact this office, call [phone], or present to the nearest emergency room should any of these events occur. Discussed crisis intervention services and means to access. Patient adamantly and convincingly denies current suicidal or homicidal ideation or perceptual disturbance. Assisted Patient in processing session content; acknowledged and normalized Patient’s thoughts, feelings, and concerns by utilizing a person-centered approach in efforts to build appropriate rapport and a positive therapeutic relationship with open and honest communication.     Follow Up:   Return in about 2 weeks (around 8/13/2025), or at 10:30.       SEAN Morrow   [facility]

## 2025-07-30 NOTE — TREATMENT PLAN
Multi-Disciplinary Problems (from Behavioral Health Treatment Plan)      Active Problems       Problem: Trauma and Stressor Related Disorders  Start Date: 07/30/25      Problem Details:           Goal Priority Start Date Expected End Date End Date    Patient will process and move through trauma in a way that improves self regard and the patients ability to function optimally in the world around them. -- 07/30/25 01/28/26 --    Goal Details: Progress toward goal:  Not appropriate to rate progress toward goal since this is the initial treatment plan.        Goal Intervention Frequency Start Date End Date    Assist patient in identifying ways that trauma has negatively impacted their view of themselves and the world. Weekly 07/30/25 --    Intervention Details: Duration of treatment until discharged.        Goal Intervention Frequency Start Date End Date    Process trauma in the context of the safe session environment. Weekly 07/30/25 --    Intervention Details: Duration of treatment until discharged.        Goal Intervention Frequency Start Date End Date    Develop a plan of behavior changes that will reduce the stress of the trauma. Weekly 07/30/25 --    Intervention Details: Duration of treatment until discharged.                        Reviewed By       Julius Casas LPCC 07/30/25 1230    Julius Casas LPCC 07/30/25 1223                     I have discussed and reviewed this treatment plan with the patient.

## 2025-08-13 ENCOUNTER — OFFICE VISIT (OUTPATIENT)
Age: 30
End: 2025-08-13
Payer: COMMERCIAL

## 2025-08-13 DIAGNOSIS — F43.9 TRAUMA AND STRESSOR-RELATED DISORDER: Primary | ICD-10-CM

## 2025-08-20 ENCOUNTER — OFFICE VISIT (OUTPATIENT)
Age: 30
End: 2025-08-20
Payer: COMMERCIAL

## 2025-08-20 DIAGNOSIS — F43.9 TRAUMA AND STRESSOR-RELATED DISORDER: Primary | ICD-10-CM

## 2025-08-26 ENCOUNTER — OFFICE VISIT (OUTPATIENT)
Dept: BEHAVIORAL HEALTH | Facility: CLINIC | Age: 30
End: 2025-08-26
Payer: COMMERCIAL

## 2025-08-26 DIAGNOSIS — F43.9 TRAUMA AND STRESSOR-RELATED DISORDER: Primary | ICD-10-CM
